# Patient Record
Sex: FEMALE | Race: WHITE | NOT HISPANIC OR LATINO | Employment: FULL TIME | URBAN - METROPOLITAN AREA
[De-identification: names, ages, dates, MRNs, and addresses within clinical notes are randomized per-mention and may not be internally consistent; named-entity substitution may affect disease eponyms.]

---

## 2022-03-01 ENCOUNTER — OFFICE VISIT (OUTPATIENT)
Dept: FAMILY MEDICINE | Facility: CLINIC | Age: 37
End: 2022-03-01
Payer: COMMERCIAL

## 2022-03-01 VITALS
OXYGEN SATURATION: 97 % | DIASTOLIC BLOOD PRESSURE: 86 MMHG | BODY MASS INDEX: 33.99 KG/M2 | HEART RATE: 86 BPM | HEIGHT: 65 IN | SYSTOLIC BLOOD PRESSURE: 120 MMHG | WEIGHT: 204 LBS

## 2022-03-01 DIAGNOSIS — R05.9 COUGH: Primary | ICD-10-CM

## 2022-03-01 DIAGNOSIS — J34.89 SINUS PRESSURE: ICD-10-CM

## 2022-03-01 DIAGNOSIS — R06.02 SHORTNESS OF BREATH: ICD-10-CM

## 2022-03-01 LAB
ANION GAP SERPL CALCULATED.3IONS-SCNC: 12 MMOL/L (ref 5–18)
BASOPHILS # BLD AUTO: 0 10E3/UL (ref 0–0.2)
BASOPHILS NFR BLD AUTO: 0 %
BUN SERPL-MCNC: 8 MG/DL (ref 8–22)
CALCIUM SERPL-MCNC: 9.2 MG/DL (ref 8.5–10.5)
CHLORIDE BLD-SCNC: 106 MMOL/L (ref 98–107)
CO2 SERPL-SCNC: 23 MMOL/L (ref 22–31)
CREAT SERPL-MCNC: 0.77 MG/DL (ref 0.6–1.1)
D DIMER PPP FEU-MCNC: 0.33 UG/ML FEU (ref 0–0.5)
EOSINOPHIL # BLD AUTO: 0.5 10E3/UL (ref 0–0.7)
EOSINOPHIL NFR BLD AUTO: 5 %
ERYTHROCYTE [DISTWIDTH] IN BLOOD BY AUTOMATED COUNT: 12.8 % (ref 10–15)
GFR SERPL CREATININE-BSD FRML MDRD: >90 ML/MIN/1.73M2
GLUCOSE BLD-MCNC: 94 MG/DL (ref 70–125)
HCT VFR BLD AUTO: 39.2 % (ref 35–47)
HGB BLD-MCNC: 13 G/DL (ref 11.7–15.7)
IMM GRANULOCYTES # BLD: 0 10E3/UL
IMM GRANULOCYTES NFR BLD: 0 %
LYMPHOCYTES # BLD AUTO: 3.6 10E3/UL (ref 0.8–5.3)
LYMPHOCYTES NFR BLD AUTO: 36 %
MCH RBC QN AUTO: 28.3 PG (ref 26.5–33)
MCHC RBC AUTO-ENTMCNC: 33.2 G/DL (ref 31.5–36.5)
MCV RBC AUTO: 85 FL (ref 78–100)
MONOCYTES # BLD AUTO: 0.5 10E3/UL (ref 0–1.3)
MONOCYTES NFR BLD AUTO: 5 %
NEUTROPHILS # BLD AUTO: 5.3 10E3/UL (ref 1.6–8.3)
NEUTROPHILS NFR BLD AUTO: 54 %
PLATELET # BLD AUTO: 329 10E3/UL (ref 150–450)
POTASSIUM BLD-SCNC: 3.8 MMOL/L (ref 3.5–5)
RBC # BLD AUTO: 4.6 10E6/UL (ref 3.8–5.2)
SODIUM SERPL-SCNC: 141 MMOL/L (ref 136–145)
WBC # BLD AUTO: 10 10E3/UL (ref 4–11)

## 2022-03-01 PROCEDURE — 36415 COLL VENOUS BLD VENIPUNCTURE: CPT | Performed by: PHYSICIAN ASSISTANT

## 2022-03-01 PROCEDURE — 80048 BASIC METABOLIC PNL TOTAL CA: CPT | Performed by: PHYSICIAN ASSISTANT

## 2022-03-01 PROCEDURE — 85025 COMPLETE CBC W/AUTO DIFF WBC: CPT | Performed by: PHYSICIAN ASSISTANT

## 2022-03-01 PROCEDURE — 99204 OFFICE O/P NEW MOD 45 MIN: CPT | Performed by: PHYSICIAN ASSISTANT

## 2022-03-01 PROCEDURE — 85379 FIBRIN DEGRADATION QUANT: CPT | Performed by: PHYSICIAN ASSISTANT

## 2022-03-01 RX ORDER — PREDNISONE 20 MG/1
40 TABLET ORAL DAILY
Qty: 5 TABLET | Refills: 0 | Status: SHIPPED | OUTPATIENT
Start: 2022-03-01 | End: 2022-09-02

## 2022-03-01 RX ORDER — LEVOFLOXACIN 500 MG/1
500 TABLET, FILM COATED ORAL DAILY
Qty: 7 TABLET | Refills: 0 | Status: SHIPPED | OUTPATIENT
Start: 2022-03-01 | End: 2022-03-01

## 2022-03-01 RX ORDER — DOXYCYCLINE 100 MG/1
100 CAPSULE ORAL 2 TIMES DAILY
Qty: 14 CAPSULE | Refills: 0 | Status: SHIPPED | OUTPATIENT
Start: 2022-03-01 | End: 2022-03-08

## 2022-03-01 ASSESSMENT — ENCOUNTER SYMPTOMS
ACTIVITY CHANGE: 0
SINUS PAIN: 1
FATIGUE: 1
STRIDOR: 0
VOMITING: 0
HEADACHES: 0
SORE THROAT: 0
UNEXPECTED WEIGHT CHANGE: 0
FEVER: 0
EYE PAIN: 0
SHORTNESS OF BREATH: 1
ABDOMINAL PAIN: 0
DIAPHORESIS: 0
DIARRHEA: 0
DIZZINESS: 0
NAUSEA: 0
EYE ITCHING: 0
NECK STIFFNESS: 0
BACK PAIN: 0
RHINORRHEA: 1
PALPITATIONS: 0
FACIAL SWELLING: 0
COUGH: 1
EYE REDNESS: 0
LIGHT-HEADEDNESS: 0
EYE DISCHARGE: 0
CONSTIPATION: 0
CHILLS: 0
WHEEZING: 0

## 2022-03-01 NOTE — PROGRESS NOTES
Progress Note  3/01/22     Chief Complaint   Patient presents with     Sinus Problem     end DEc pt had covid, then after got lots of sinus drainage and was treated, but still not getting better, pt having pain in back with deep breath          HPI    Caro Conrad is a pleasant  36 year old year old female  who present to the clinic today for ongoing sinus pressure, congestion, cough, and dyspnea on exertion.  He was diagnosed with COVID-19 the end of December had 5 days of fevers, chills, body aches and fully recovered.  Her symptoms started a couple weeks after this.  She has been having ongoing symptoms for about 2 months.  She was treated with 2 rounds of cefdinir, and prednisone.  Was also having some dizziness and was treated with meclizine which helped with the dizziness.  She feels like she was improving in regards to the sinus pressure and cough towards the end of the antibiotics but once the antibiotics were completed her symptoms returned.  She states that she is having some mild dyspnea with going up a flight of stairs.  She denies any chest pains.  Blood pressure today is 120/86.  SPO2 is 97% on room air.  She is not tachycardic with a heart rate of 86.    ROS    Review of Systems   Constitutional: Positive for fatigue. Negative for activity change, chills, diaphoresis, fever and unexpected weight change.   HENT: Positive for congestion, ear pain (Right), postnasal drip, rhinorrhea and sinus pain. Negative for dental problem, ear discharge, facial swelling, nosebleeds and sore throat.    Eyes: Negative for pain, discharge, redness and itching.   Respiratory: Positive for cough and shortness of breath. Negative for wheezing and stridor.    Cardiovascular: Negative for chest pain and palpitations.   Gastrointestinal: Negative for abdominal pain, constipation, diarrhea, nausea and vomiting.   Musculoskeletal: Negative for back pain and neck stiffness.   Skin: Negative for rash.   Neurological:  "Negative for dizziness, light-headedness and headaches.            There is no problem list on file for this patient.       No past medical history on file.       Social History     Socioeconomic History     Marital status:      Spouse name: Not on file     Number of children: Not on file     Years of education: Not on file     Highest education level: Not on file   Occupational History     Not on file   Tobacco Use     Smoking status: Current Every Day Smoker     Smokeless tobacco: Never Used   Substance and Sexual Activity     Alcohol use: No     Drug use: Not on file     Sexual activity: Not on file   Other Topics Concern     Not on file   Social History Narrative     Not on file     Social Determinants of Health     Financial Resource Strain: Not on file   Food Insecurity: Not on file   Transportation Needs: Not on file   Physical Activity: Not on file   Stress: Not on file   Social Connections: Not on file   Intimate Partner Violence: Not on file   Housing Stability: Not on file           Allergies   Allergen Reactions     Codeine Other (See Comments)     itching     Hydrocodone-Acetaminophen Itching     Iron Itching     Latex Hives     Oxycodone Itching          Current Outpatient Medications   Medication     doxycycline hyclate (VIBRAMYCIN) 100 MG capsule     predniSONE (DELTASONE) 20 MG tablet     No current facility-administered medications for this visit.            /86   Pulse 86   Ht 1.651 m (5' 5\")   Wt 92.5 kg (204 lb)   LMP 02/26/2022 (Exact Date)   SpO2 97%   Breastfeeding No   BMI 33.95 kg/m       Recent Results (from the past 240 hour(s))   Basic metabolic panel    Collection Time: 03/01/22  4:26 PM   Result Value Ref Range    Sodium 141 136 - 145 mmol/L    Potassium 3.8 3.5 - 5.0 mmol/L    Chloride 106 98 - 107 mmol/L    Carbon Dioxide (CO2) 23 22 - 31 mmol/L    Anion Gap 12 5 - 18 mmol/L    Urea Nitrogen 8 8 - 22 mg/dL    Creatinine 0.77 0.60 - 1.10 mg/dL    Calcium 9.2 8.5 - " 10.5 mg/dL    Glucose 94 70 - 125 mg/dL    GFR Estimate >90 >60 mL/min/1.73m2   D dimer, quantitative    Collection Time: 03/01/22  4:26 PM   Result Value Ref Range    D-Dimer Quantitative 0.33 0.00 - 0.50 ug/mL FEU   CBC with platelets and differential    Collection Time: 03/01/22  4:26 PM   Result Value Ref Range    WBC Count 10.0 4.0 - 11.0 10e3/uL    RBC Count 4.60 3.80 - 5.20 10e6/uL    Hemoglobin 13.0 11.7 - 15.7 g/dL    Hematocrit 39.2 35.0 - 47.0 %    MCV 85 78 - 100 fL    MCH 28.3 26.5 - 33.0 pg    MCHC 33.2 31.5 - 36.5 g/dL    RDW 12.8 10.0 - 15.0 %    Platelet Count 329 150 - 450 10e3/uL    % Neutrophils 54 %    % Lymphocytes 36 %    % Monocytes 5 %    % Eosinophils 5 %    % Basophils 0 %    % Immature Granulocytes 0 %    Absolute Neutrophils 5.3 1.6 - 8.3 10e3/uL    Absolute Lymphocytes 3.6 0.8 - 5.3 10e3/uL    Absolute Monocytes 0.5 0.0 - 1.3 10e3/uL    Absolute Eosinophils 0.5 0.0 - 0.7 10e3/uL    Absolute Basophils 0.0 0.0 - 0.2 10e3/uL    Absolute Immature Granulocytes 0.0 <=0.4 10e3/uL        Physical Exam:     Physical Exam  Vitals and nursing note reviewed.   Constitutional:       General: She is awake. She is not in acute distress.     Appearance: Normal appearance. She is well-developed and well-groomed. She is not ill-appearing, toxic-appearing or diaphoretic.   HENT:      Head: Normocephalic and atraumatic.      Right Ear: No swelling or tenderness. A middle ear effusion is present. No mastoid tenderness. Tympanic membrane is not erythematous, retracted or bulging.      Left Ear: Tympanic membrane, ear canal and external ear normal. No mastoid tenderness.      Mouth/Throat:      Lips: Pink.      Mouth: Mucous membranes are moist.      Tongue: No lesions.      Pharynx: Oropharynx is clear.      Tonsils: No tonsillar exudate or tonsillar abscesses. 0 on the right. 0 on the left.   Eyes:      General: Lids are normal.         Right eye: No discharge.         Left eye: No discharge.       Extraocular Movements: Extraocular movements intact.      Conjunctiva/sclera: Conjunctivae normal.   Neck:      Vascular: No JVD.      Trachea: Trachea normal.   Cardiovascular:      Rate and Rhythm: Normal rate and regular rhythm.      Heart sounds: No murmur heard.    No friction rub. No gallop.   Pulmonary:      Effort: Pulmonary effort is normal. No accessory muscle usage, prolonged expiration or respiratory distress.      Breath sounds: No decreased breath sounds, wheezing, rhonchi or rales.   Musculoskeletal:      Cervical back: Full passive range of motion without pain and neck supple.      Right lower leg: No edema.      Left lower leg: No edema.   Lymphadenopathy:      Cervical: No cervical adenopathy.   Skin:     General: Skin is warm.      Findings: No rash.   Neurological:      General: No focal deficit present.      Mental Status: She is alert and oriented to person, place, and time.      GCS: GCS eye subscore is 4. GCS verbal subscore is 5. GCS motor subscore is 6.      Gait: Gait is intact.   Psychiatric:         Behavior: Behavior is cooperative.          Assessment/Plan:       ICD-10-CM    1. Cough  R05.9 XR Chest 2 Views     CBC with platelets and differential     Basic metabolic panel     D dimer, quantitative     CBC with platelets and differential     Basic metabolic panel     D dimer, quantitative   2. Sinus pressure  J34.89 XR Chest 2 Views     CBC with platelets and differential     Basic metabolic panel     D dimer, quantitative     CBC with platelets and differential     Basic metabolic panel     D dimer, quantitative     predniSONE (DELTASONE) 20 MG tablet     doxycycline hyclate (VIBRAMYCIN) 100 MG capsule     DISCONTINUED: levofloxacin (LEVAQUIN) 500 MG tablet   3. Shortness of breath  R06.02        #1 chronic sinusitis-been on 2 rounds of cefdinir and prednisone.  Symptoms do improve while on antibiotics but slowly return after she finishes.  Start her on Doxy 100 mg x 7 days and  prednisone 40 mg x 5days.If not improving after antibiotics and prednisone will need to do CT sinuses and ENT referral. She is to update me in 1-2 weeks.     #2 cough and shortness of breath-she has been treated with 2 rounds of cefdinir.  I will get a chest x-ray CBC and a BMP today.  She did have Covid the end of December.  She continues to have cough and exertional shortness of breath.  No chest pains.  Blood pressure is 120/86 sats are 97% on room air 86 for pulse.  She does not appear to be in any respiratory distress.  Due to the mild exertional dyspnea we will do a D-dimer to rule out PE with her recent Covid.  If this is positive we will move forward with a CT PE protocol.D-dimer negative. CXR negative for infection. WBC WNL. Kidney function stable.      Earnest Marrero PA-C

## 2022-03-25 ENCOUNTER — MYC MEDICAL ADVICE (OUTPATIENT)
Dept: FAMILY MEDICINE | Facility: CLINIC | Age: 37
End: 2022-03-25
Payer: COMMERCIAL

## 2022-03-25 DIAGNOSIS — J32.9 CHRONIC SINUSITIS, UNSPECIFIED LOCATION: Primary | ICD-10-CM

## 2022-03-25 DIAGNOSIS — J34.89 SINUS PRESSURE: Primary | ICD-10-CM

## 2022-03-25 RX ORDER — FLUCONAZOLE 150 MG/1
150 TABLET ORAL ONCE
Qty: 1 TABLET | Refills: 0 | Status: SHIPPED | OUTPATIENT
Start: 2022-03-25 | End: 2022-03-25

## 2022-03-25 RX ORDER — DOXYCYCLINE 100 MG/1
100 CAPSULE ORAL 2 TIMES DAILY
Qty: 20 CAPSULE | Refills: 0 | Status: SHIPPED | OUTPATIENT
Start: 2022-03-25 | End: 2022-04-04

## 2022-03-25 RX ORDER — PREDNISONE 20 MG/1
40 TABLET ORAL DAILY
Qty: 10 TABLET | Refills: 0 | Status: SHIPPED | OUTPATIENT
Start: 2022-03-25 | End: 2022-03-30

## 2022-04-02 ENCOUNTER — HEALTH MAINTENANCE LETTER (OUTPATIENT)
Age: 37
End: 2022-04-02

## 2022-04-14 ENCOUNTER — OFFICE VISIT (OUTPATIENT)
Dept: OTOLARYNGOLOGY | Facility: CLINIC | Age: 37
End: 2022-04-14
Attending: PHYSICIAN ASSISTANT
Payer: COMMERCIAL

## 2022-04-14 DIAGNOSIS — J34.89 SINUS PRESSURE: ICD-10-CM

## 2022-04-14 PROCEDURE — 99203 OFFICE O/P NEW LOW 30 MIN: CPT | Performed by: OTOLARYNGOLOGY

## 2022-04-14 RX ORDER — IPRATROPIUM BROMIDE 21 UG/1
2 SPRAY, METERED NASAL EVERY 12 HOURS
Qty: 30 ML | Refills: 11 | Status: SHIPPED | OUTPATIENT
Start: 2022-04-14 | End: 2022-05-14

## 2022-04-14 NOTE — LETTER
4/14/2022         RE: Caro Conrad  [address] [place] [address]        Dear Colleague,    Thank you for referring your patient, Caro Conrad, to the [facility]. Please see a copy of my visit note below.    HPI: This patient is a 37yo F who presents for evaluation of the sinuses at the request of Earnest Marrero PA-C. The patient reports constant runny nose and congestion since probable covid around the holidays. Has almost daily headaches now too. Has been on 4 rounds of antibiotics without improvement. States that she used a nasal spray rx'ed to her for a month.     Past medical history, surgical history, social history, family history, medications, and allergies have been reviewed with the patient and are documented above.    Review of Systems: a 10-system review was performed. Pertinent positives are noted in the HPI and on a separate scanned document in the chart.    PHYSICAL EXAMINATION:  GEN: no acute distress, normocephalic  EYES: extraocular movements are intact, pupils are equal and round. Sclera clear.   EARS: auricles are normally formed. The external auditory canals are clear with minimal to no cerumen. Tympanic membranes are intact bilaterally with no signs of infection, effusion, retractions, or perforations.  NOSE: anterior nares are patent. There are no masses or lesions. The septum is non-obstructing. No percussive tenderness over the maxillary or frontal sinuses. Boggy turbinates. No polyps seen.  OC/OP: clear, dentition is in good repair but with wear that is consistent with clenching/grinding. The tongue and palate are fully mobile and symmetric. No masses or lesions. 3+ tonsils, symmetric.  NECK: soft and supple. No lymphadenopathy or masses. Airway is midline. +R>L TMJ pain on palpation.  NEURO: CN VII and XII symmetric. alert and oriented. No spontaneous nystagmus. Gait is normal.  PULM: breathing comfortably on room air,  normal chest expansion with respiration  CARDS: no cyanosis or clubbing. Normal carotid pulses.    IMAGING: none    MEDICAL DECISION-MAKING: This patient is a 35yo F with a lot of symptoms. Will simply start with a CT of the sinuses and follow that where it leads. If there are polyps or chronic sinus disease identified, will have a plan for that. If the sinuses are clear on imaging, symptoms may be secondary to allergies, TMJ, combination, etc. Will determine next best steps after imaging is available.           Again, thank you for allowing me to participate in the care of your patient.        Sincerely,        Candida Peña MD

## 2022-04-14 NOTE — PROGRESS NOTES
HPI: This patient is a 37yo F who presents for evaluation of the sinuses at the request of Earnest Marrero PA-C. The patient reports constant runny nose and congestion since probable covid around the holidays. Has almost daily headaches now too. Has been on 4 rounds of antibiotics without improvement. States that she used a nasal spray rx'ed to her for a month.     Past medical history, surgical history, social history, family history, medications, and allergies have been reviewed with the patient and are documented above.    Review of Systems: a 10-system review was performed. Pertinent positives are noted in the HPI and on a separate scanned document in the chart.    PHYSICAL EXAMINATION:  GEN: no acute distress, normocephalic  EYES: extraocular movements are intact, pupils are equal and round. Sclera clear.   EARS: auricles are normally formed. The external auditory canals are clear with minimal to no cerumen. Tympanic membranes are intact bilaterally with no signs of infection, effusion, retractions, or perforations.  NOSE: anterior nares are patent. There are no masses or lesions. The septum is non-obstructing. No percussive tenderness over the maxillary or frontal sinuses. Boggy turbinates. No polyps seen.  OC/OP: clear, dentition is in good repair but with wear that is consistent with clenching/grinding. The tongue and palate are fully mobile and symmetric. No masses or lesions. 3+ tonsils, symmetric.  NECK: soft and supple. No lymphadenopathy or masses. Airway is midline. +R>L TMJ pain on palpation.  NEURO: CN VII and XII symmetric. alert and oriented. No spontaneous nystagmus. Gait is normal.  PULM: breathing comfortably on room air, normal chest expansion with respiration  CARDS: no cyanosis or clubbing. Normal carotid pulses.    IMAGING: none    MEDICAL DECISION-MAKING: This patient is a 37yo F with a lot of symptoms. Will simply start with a CT of the sinuses and follow that where it leads. If there are  polyps or chronic sinus disease identified, will have a plan for that. If the sinuses are clear on imaging, symptoms may be secondary to allergies, TMJ, combination, etc. Will determine next best steps after imaging is available.

## 2022-04-24 ENCOUNTER — HOSPITAL ENCOUNTER (OUTPATIENT)
Dept: CT IMAGING | Facility: CLINIC | Age: 37
Discharge: HOME | End: 2022-04-24
Attending: OTOLARYNGOLOGY | Admitting: OTOLARYNGOLOGY
Payer: COMMERCIAL

## 2022-04-24 DIAGNOSIS — J34.89 SINUS PRESSURE: ICD-10-CM

## 2022-04-24 PROCEDURE — 70486 CT MAXILLOFACIAL W/O DYE: CPT

## 2022-04-29 ENCOUNTER — TELEPHONE (OUTPATIENT)
Dept: OTOLARYNGOLOGY | Facility: CLINIC | Age: 37
End: 2022-04-29
Payer: COMMERCIAL

## 2022-04-29 NOTE — TELEPHONE ENCOUNTER
Left a message for Caro. Her CT sinus is actually clear. There is comment about partial effacement of the [facility] on the left, where there is a little thickness to the mucosa, but this is no obstructing and cannot be the reason for her headaches or bilaterally runny nose. Can refer to Allergy for the rhinorrhea, and/or Neurology or TMJ clinic for the headaches.

## 2022-05-02 DIAGNOSIS — R51.9 ACUTE NONINTRACTABLE HEADACHE, UNSPECIFIED HEADACHE TYPE: Primary | ICD-10-CM

## 2022-05-02 NOTE — PROGRESS NOTES
Spoke with Caro regarding her CT scan. The scan does not reveal any sinus finding for her headaches and it is sounding more and more like migraines. A person in her [place] suggested CSF leak, which is highly unlikely; however, it is easy to test for. Will 1) refer to Neurology for headaches and 2) have my nurse coordinate with Caro regarding a collection vessel for some of the rhinorrhea to send off for beta-2 transferrin.

## 2022-08-10 ENCOUNTER — MYC MEDICAL ADVICE (OUTPATIENT)
Dept: FAMILY MEDICINE | Facility: CLINIC | Age: 37
End: 2022-08-10

## 2022-08-26 ENCOUNTER — MYC MEDICAL ADVICE (OUTPATIENT)
Dept: FAMILY MEDICINE | Facility: CLINIC | Age: 37
End: 2022-08-26

## 2022-08-26 ENCOUNTER — OFFICE VISIT (OUTPATIENT)
Dept: FAMILY MEDICINE | Facility: CLINIC | Age: 37
End: 2022-08-26
Payer: COMMERCIAL

## 2022-08-26 VITALS
HEART RATE: 68 BPM | BODY MASS INDEX: 33.28 KG/M2 | OXYGEN SATURATION: 97 % | DIASTOLIC BLOOD PRESSURE: 84 MMHG | WEIGHT: 200 LBS | TEMPERATURE: 97.9 F | SYSTOLIC BLOOD PRESSURE: 122 MMHG | RESPIRATION RATE: 16 BRPM

## 2022-08-26 DIAGNOSIS — J02.9 VIRAL PHARYNGITIS: Primary | ICD-10-CM

## 2022-08-26 LAB
DEPRECATED S PYO AG THROAT QL EIA: NEGATIVE
GROUP A STREP BY PCR: NOT DETECTED
SARS-COV-2 RNA RESP QL NAA+PROBE: NEGATIVE

## 2022-08-26 PROCEDURE — U0003 INFECTIOUS AGENT DETECTION BY NUCLEIC ACID (DNA OR RNA); SEVERE ACUTE RESPIRATORY SYNDROME CORONAVIRUS 2 (SARS-COV-2) (CORONAVIRUS DISEASE [COVID-19]), AMPLIFIED PROBE TECHNIQUE, MAKING USE OF HIGH THROUGHPUT TECHNOLOGIES AS DESCRIBED BY CMS-2020-01-R: HCPCS | Performed by: PHYSICIAN ASSISTANT

## 2022-08-26 PROCEDURE — U0005 INFEC AGEN DETEC AMPLI PROBE: HCPCS | Performed by: PHYSICIAN ASSISTANT

## 2022-08-26 PROCEDURE — 99213 OFFICE O/P EST LOW 20 MIN: CPT | Mod: CS | Performed by: PHYSICIAN ASSISTANT

## 2022-08-26 PROCEDURE — 87651 STREP A DNA AMP PROBE: CPT | Performed by: PHYSICIAN ASSISTANT

## 2022-08-26 RX ORDER — IBUPROFEN 600 MG/1
600 TABLET, FILM COATED ORAL EVERY 6 HOURS PRN
Qty: 30 TABLET | Refills: 0 | Status: SHIPPED | OUTPATIENT
Start: 2022-08-26 | End: 2022-09-02

## 2022-08-26 RX ORDER — IBUPROFEN 200 MG
200 TABLET ORAL EVERY 4 HOURS PRN
COMMUNITY
End: 2022-09-02

## 2022-08-26 NOTE — PROGRESS NOTES
Assessment & Plan:      Problem List Items Addressed This Visit    None     Visit Diagnoses     Viral pharyngitis    -  Primary    Relevant Medications    ibuprofen (ADVIL/MOTRIN) 600 MG tablet    Other Relevant Orders    Streptococcus A Rapid Screen w/Reflex to PCR - Clinic Collect (Completed)    Symptomatic; Yes; 8/23/2022 COVID-19 Virus (Coronavirus) by PCR Nose    Group A Streptococcus PCR Throat Swab        Medical Decision Making  Patient presents with acute onset sore throat and ear pains.  No signs of otitis media and rapid strep is negative at this time.  Suspect likely viral pharyngitis.  Recommend over-the-counter analgesics, honey, salt water gargles, fluids, and rest.  There is in process COVID-19.  Discussed signs of worsening symptoms and when to follow-up with PCP if no symptom improvement.     Subjective:      Caro Conrad is a 37 year old female here for evaluation of throat pain.  Onset of symptoms was 2 to 3 days ago.  Associated symptoms include right ear discomfort and swollen lymph nodes in the neck.  Patient did note subjective fevers and chills.  She started taking a leftover prescription of amoxicillin from her dentist.  She was taking 2 pills a day for 3 days.  She ran out of the medications and states her symptoms only improved slightly.  She still has significant throat discomfort.     The following portions of the patient's history were reviewed and updated as appropriate: allergies, current medications, and problem list.     Review of Systems  Pertinent items are noted in HPI.    Allergies  Allergies   Allergen Reactions     Codeine Other (See Comments)     itching     Hydrocodone-Acetaminophen Itching     Iron Itching     Latex Hives     Oxycodone Itching       No family history on file.    Social History     Tobacco Use     Smoking status: Current Every Day Smoker     Smokeless tobacco: Never Used   Substance Use Topics     Alcohol use: No        Objective:      /84    Pulse 68   Temp 97.9  F (36.6  C)   Resp 16   Wt 90.7 kg (200 lb)   LMP 08/10/2022   SpO2 97%   Breastfeeding No   BMI 33.28 kg/m    General appearance - alert, well appearing, and in no distress and non-toxic  Ears - bilateral TM's and external ear canals normal  Nose - normal and patent, no erythema, discharge or polyps  Mouth - Significant tonsillar swelling with erythema, no exudate, mucous membranes moist, lips and tongue normal  Neck - Moderate bilateral anterior cervical lymphadenopathy  Chest - clear to auscultation, no wheezes, rales or rhonchi, symmetric air entry  Heart - normal rate, regular rhythm, normal S1, S2, no murmurs, rubs, clicks or gallops     Lab & Imaging Results    Results for orders placed or performed in visit on 08/26/22   Streptococcus A Rapid Screen w/Reflex to PCR - Clinic Collect     Status: Normal    Specimen: Throat; Swab   Result Value Ref Range    Group A Strep antigen Negative Negative       I personally reviewed these results and discussed findings with the patient.    The use of Dragon/[employer] dictation services was used to construct the content of this note; any grammatical errors are non-intentional. Please contact the author directly if you are in need of any clarification.

## 2022-09-02 ENCOUNTER — OFFICE VISIT (OUTPATIENT)
Dept: FAMILY MEDICINE | Facility: CLINIC | Age: 37
End: 2022-09-02
Payer: COMMERCIAL

## 2022-09-02 VITALS
RESPIRATION RATE: 16 BRPM | BODY MASS INDEX: 33.58 KG/M2 | DIASTOLIC BLOOD PRESSURE: 84 MMHG | HEART RATE: 83 BPM | SYSTOLIC BLOOD PRESSURE: 116 MMHG | WEIGHT: 201.8 LBS | OXYGEN SATURATION: 97 %

## 2022-09-02 DIAGNOSIS — J30.1 SEASONAL ALLERGIC RHINITIS DUE TO POLLEN: ICD-10-CM

## 2022-09-02 DIAGNOSIS — F90.9 ATTENTION DEFICIT HYPERACTIVITY DISORDER (ADHD), UNSPECIFIED ADHD TYPE: Primary | ICD-10-CM

## 2022-09-02 PROCEDURE — 99214 OFFICE O/P EST MOD 30 MIN: CPT | Performed by: PHYSICIAN ASSISTANT

## 2022-09-02 RX ORDER — DEXTROAMPHETAMINE SACCHARATE, AMPHETAMINE ASPARTATE MONOHYDRATE, DEXTROAMPHETAMINE SULFATE AND AMPHETAMINE SULFATE 2.5; 2.5; 2.5; 2.5 MG/1; MG/1; MG/1; MG/1
10 CAPSULE, EXTENDED RELEASE ORAL DAILY
Qty: 30 CAPSULE | Refills: 0 | Status: SHIPPED | OUTPATIENT
Start: 2022-09-02 | End: 2022-10-14

## 2022-09-02 RX ORDER — MONTELUKAST SODIUM 10 MG/1
10 TABLET ORAL AT BEDTIME
Qty: 30 TABLET | Refills: 11 | Status: SHIPPED | OUTPATIENT
Start: 2022-09-02 | End: 2022-09-12

## 2022-09-02 RX ORDER — LORATADINE 10 MG/1
10 TABLET ORAL DAILY
COMMUNITY

## 2022-09-02 ASSESSMENT — ENCOUNTER SYMPTOMS
NERVOUS/ANXIOUS: 0
HEARTBURN: 0
SLEEP DISTURBANCE: 0
CONFUSION: 0
CONSTIPATION: 0
VOMITING: 0
WHEEZING: 0
DECREASED CONCENTRATION: 1
LIGHT-HEADEDNESS: 0
DIZZINESS: 0
CHILLS: 0
HEADACHES: 0
PALPITATIONS: 0
COUGH: 1
SHORTNESS OF BREATH: 0
DIARRHEA: 0
NAUSEA: 0
ABDOMINAL PAIN: 0
HYPERACTIVE: 0
FATIGUE: 0
HALLUCINATIONS: 0
SINUS PRESSURE: 0
SINUS PAIN: 0
AGITATION: 0
RHINORRHEA: 0

## 2022-09-02 ASSESSMENT — PAIN SCALES - GENERAL: PAINLEVEL: NO PAIN (0)

## 2022-09-02 NOTE — LETTER
[facility]  09/02/22  Patient: Caro Conrad  YOB: 1985  Medical Record Number: [id number]                                                                                  Non-Opioid Controlled Substance Agreement    This is an agreement between you and your provider regarding safe and appropriate use of controlled substances prescribed by your care team. Controlled substances are?medicines that can cause physical and mental dependence (abuse).     There are strict laws about having and using these medicines. We here at [facility] are  committed to working with you in your efforts to get better. To support you in this work, we'll help you schedule regular office appointments for medicine refills. If we must cancel or change your appointment for any reason, we'll make sure you have enough medicine to last until your next appointment.     As a Provider, I will:     Listen carefully to your concerns while treating you with respect.     Recommend a treatment plan that I believe is in your best interest and may involve therapies other than medicine.      Talk with you often about the possible benefits and the risk of harm of any medicine that we prescribe for you.    Assess the safety of this medicine and check how well it works.      Provide a plan on how to taper (discontinue or go off) using this medicine if the decision is made to stop its use.      ::  As a Patient, I understand controlled substances:       Are prescribed by my care provider to help me function or work and manage my condition(s).?    Are strong medicines and can cause serious side effects.       Need to be taken exactly as prescribed.?Combining controlled substances with certain medicines or chemicals (such as illegal drugs, alcohol, sedatives, sleeping pills, and benzodiazepines) can be dangerous or even fatal.? If I stop taking my medicines suddenly, I may have severe withdrawal symptoms.      The risks, benefits, and side effects of these medicine(s) were explained to me. I agree that:    1. I will take part in other treatments as advised by my care team. This may be psychiatry or counseling, physical therapy, behavioral therapy, group treatment or a referral to specialist.    2. I will keep all my appointments and understand this is part of the monitoring of controlled substances.?My care team may require an office visit for EVERY controlled substance refill. If I miss appointments or don t follow instructions, my care team may stop my medicine    3. I will take my medicines as prescribed. I will not change the dose or schedule unless my care team tells me to. There will be no refills if I run out early.      4. I may be asked to come to the clinic and complete a urine drug test or complete a pill count. If I don t give a urine sample or participate in a pill count, the care team may stop my medicine.    5. I will only receive controlled substance prescriptions from this clinic. If I am treated by another provider, I will tell them that I am taking controlled substances and that I have a treatment agreement with this provider. I will inform my [facility] care team within one business day if I am given a prescription for any controlled substance by another healthcare provider. My [facility] care team can contact other providers and pharmacists about my use of any medicines.    6. It is up to me to make sure that I don't run out of my medicines on weekends or holidays.?If my care team is willing to refill my prescription without a visit, I must request refills only during office hours. Refills may take up to 3 business days to process. I will use one pharmacy to fill all my controlled substance prescriptions. I will notify the clinic about any changes to my insurance or medicine availability.    7. I am responsible for my prescriptions. If the medicine/prescription is lost, stolen or  destroyed, it will not be replaced.?I also agree not to share controlled substance medicines with anyone.     8. I am aware I should not use any illegal or recreational drugs. I agree not to drink alcohol unless my care team says I can.     9. If I enroll in the [place] Medical Cannabis program, I will tell my care team before my next refill.    10. I will tell my care team right away if I become pregnant, have a new medical problem treated outside of my regular clinic, or have a change in my medicines.     11. I understand that this medicine can affect my thinking, judgment and reaction time.? Alcohol and drugs affect the brain and body, which can affect the safety of my driving. Being under the influence of alcohol or drugs can affect my decision-making, behaviors, personal safety and the safety of others. Driving while impaired (DWI) can occur if a person is driving, operating or in physical control of a car, motorcycle, boat, snowmobile, ATV, motorbike, off-road vehicle or any other motor vehicle ([place] Statute 169[place].20). I understand the risk if I choose to drive or operate any vehicle or machinery.    I understand that if I do not follow any of the conditions above, my prescriptions or treatment may be stopped or changed.   I agree that my provider, clinic care team and pharmacy may work with any city, state or federal law enforcement agency that investigates the misuse, sale or other diversion of my controlled medicine. I will allow my provider to discuss my care with, or share a copy of, this agreement with any other treating provider, pharmacy or emergency room where I receive care.     I have read this agreement and have asked questions about anything I did not understand.    ________________________________________________________  Patient Signature - Caro Conrad     ___________________                   Date     ________________________________________________________  Provider Signature - Earnest  RAHEL Marrero, PA-C       ___________________                   Date     ________________________________________________________  Witness Signature (required if provider not present while patient signing)          ___________________                   Date

## 2022-09-02 NOTE — PROGRESS NOTES
Assessment & Plan       ICD-10-CM    1. Attention deficit hyperactivity disorder (ADHD), unspecified ADHD type  F90.9 amphetamine-dextroamphetamine (ADDERALL XR) 10 MG 24 hr capsule   2. Seasonal allergic rhinitis due to pollen  J30.1 montelukast (SINGULAIR) 10 MG tablet     #1 ADHD-we will restart her on Adderall 10 mg daily.  She tolerated the Adderall in the past.  Side effects of the medication were discussed.  Controlled substance agreement updated.  I would like to see her back in 1month for recheck on how things are going after starting Adderall.  She will need visits every 3 months thereafter.    #2 seasonal allergies-continue with over-the-counter antihistamines and I will start her on Singulair.  Could also start Flonase.     No follow-ups on file.    KASI Malone [facility]   Caro is a 37 year old, presenting for the following health issues:  Medication Update (Would like to go back on Adderall. Pt states it worked well for her but she stopped it because she felt slow and she was working from home so she thought she could manage it on her own. Now she is having compulsive thoughts and having trouble staying focused with work. Pt is still working from home currently. ) and Nasal Congestion (Runny nose and sneezing started when she had covid 9 months ago. Has been taking claritin for allergies but it doesn't help at all. Looking for other recommendations. )      Caro is a pleasant 37-year-old female that presents to the clinic today for restarting ADHD medications.  She states that she was diagnosed with ADHD about 10 years ago.  She was on Adderall.  She stopped the Adderall December 2021 as she was working from home and thought she could go without the stimulants.  The last 2 or 3 months she is having difficulty focusing, concentration, and finishing simple tasks.  It is also interfering with her work.  He would like to restart on Adderall as  her symptoms have worsened.    She also has underlying seasonal allergies and using over-the-counter antihistamines which have not been in helpful.  She did see ENT.  She continues to have sneezing coughing and nasal congestion.    History of Present Illness       Reason for visit:  Medication    She eats 0-1 servings of fruits and vegetables daily.She consumes 0 sweetened beverage(s) daily.She exercises with enough effort to increase her heart rate 9 or less minutes per day.  She exercises with enough effort to increase her heart rate 3 or less days per week.   She is taking medications regularly.             Review of Systems   Constitutional: Negative for chills and fatigue.   HENT: Positive for congestion, postnasal drip and sneezing. Negative for ear discharge, ear pain, rhinorrhea, sinus pressure, sinus pain and tinnitus.    Respiratory: Positive for cough. Negative for shortness of breath and wheezing.    Cardiovascular: Negative for chest pain and palpitations.   Gastrointestinal: Negative for abdominal pain, constipation, diarrhea, heartburn, nausea and vomiting.   Neurological: Negative for dizziness, light-headedness and headaches.   Psychiatric/Behavioral: Positive for decreased concentration. Negative for agitation, behavioral problems, confusion, hallucinations, mood changes, self-injury, sleep disturbance and suicidal ideas. The patient is not nervous/anxious and is not hyperactive.             Objective    /84 (BP Location: Left arm, Patient Position: Sitting, Cuff Size: Adult Regular)   Pulse 83   Resp 16   Wt 91.5 kg (201 lb 12.8 oz)   LMP 08/10/2022   SpO2 97%   Breastfeeding No   BMI 33.58 kg/m    Body mass index is 33.58 kg/m .  Physical Exam  Vitals and nursing note reviewed.   Constitutional:       General: She is awake. She is not in acute distress.     Appearance: Normal appearance. She is well-developed and well-groomed. She is not ill-appearing, toxic-appearing or diaphoretic.    HENT:      Head: Normocephalic and atraumatic.   Cardiovascular:      Rate and Rhythm: Normal rate and regular rhythm.      Heart sounds: S1 normal and S2 normal. No murmur heard.    No friction rub. No gallop.   Pulmonary:      Effort: Pulmonary effort is normal. No accessory muscle usage, prolonged expiration or respiratory distress.      Breath sounds: Normal breath sounds and air entry.   Skin:     General: Skin is warm and dry.      Findings: No rash.   Neurological:      General: No focal deficit present.      Mental Status: She is alert and oriented to person, place, and time.      GCS: GCS eye subscore is 4. GCS verbal subscore is 5. GCS motor subscore is 6.      Motor: No weakness.      Coordination: Coordination is intact.   Psychiatric:         Attention and Perception: Attention normal.         Mood and Affect: Mood normal.         Speech: Speech normal.         Behavior: Behavior is cooperative.         Thought Content: Thought content normal.         Cognition and Memory: Cognition normal.

## 2022-09-12 ENCOUNTER — OFFICE VISIT (OUTPATIENT)
Dept: NEUROLOGY | Facility: CLINIC | Age: 37
End: 2022-09-12
Attending: OTOLARYNGOLOGY
Payer: COMMERCIAL

## 2022-09-12 VITALS
BODY MASS INDEX: 34.49 KG/M2 | HEART RATE: 79 BPM | DIASTOLIC BLOOD PRESSURE: 89 MMHG | SYSTOLIC BLOOD PRESSURE: 129 MMHG | HEIGHT: 65 IN | WEIGHT: 207 LBS

## 2022-09-12 DIAGNOSIS — G44.89 CHRONIC MIXED HEADACHE SYNDROME: ICD-10-CM

## 2022-09-12 PROBLEM — F98.8 ADD (ATTENTION DEFICIT DISORDER): Status: ACTIVE | Noted: 2017-07-31

## 2022-09-12 PROBLEM — R03.0 ELEVATED BLOOD PRESSURE READING WITHOUT DIAGNOSIS OF HYPERTENSION: Status: ACTIVE | Noted: 2021-09-27

## 2022-09-12 PROBLEM — K76.0 FATTY INFILTRATION OF LIVER: Status: ACTIVE | Noted: 2021-09-27

## 2022-09-12 PROCEDURE — 99205 OFFICE O/P NEW HI 60 MIN: CPT | Performed by: PSYCHIATRY & NEUROLOGY

## 2022-09-12 RX ORDER — NORTRIPTYLINE HYDROCHLORIDE 50 MG/1
50 CAPSULE ORAL AT BEDTIME
Qty: 30 CAPSULE | Refills: 11 | Status: SHIPPED | OUTPATIENT
Start: 2022-10-12 | End: 2022-10-14

## 2022-09-12 RX ORDER — NORTRIPTYLINE HCL 25 MG
CAPSULE ORAL
Qty: 60 CAPSULE | Refills: 6 | Status: SHIPPED | OUTPATIENT
Start: 2022-09-12 | End: 2022-10-14

## 2022-09-12 NOTE — LETTER
2022         RE: Caro Conrad  [address] [place] [address]        Dear Colleague,    Thank you for referring your patient, Caro Conrad, to the [facility] [place]. Please see a copy of my visit note below.    NEUROLOGY CONSULTATION NOTE       [facility] [place]  [address] [place] [address]  Tel: [phone]  Fax: [phone]  [email or web address]     Caro Conrad,  1985, MRN [id number]  PCP: Earnest Marrero  Date: 2022     ASSESSMENT & PLAN     Visit Diagnosis  1. Acute nonintractable headache, unspecified headache type     Chronic mixed headache syndrome  37-year-old female with ADD was referred for evaluation of progressively worsening headaches.  She has chronic neck ceric syndrome with combination of migraine headache with aura and chronic tension type headache that I suspect are evolving into rebound headaches as she admits she is taking increased amount of ibuprofen on a weekly basis.  I have recommended:    1.  Start nortriptyline 25 mg daily after 1 week increase it to 50 mg daily  2.  Minimize use of ibuprofen  3.  Follow-up in 3 months with a Melvina and if her headaches are not improving consider switching to Depakote or Topamax    Thank you again for this referral, please feel free to contact me if you have any questions.    Peter Manjarrez MD  [facility]  (Formerly, [facility]     REASON FOR CONSULTATION Headache        HISTORY OF PRESENT ILLNESS     We have been requested by Earnest Marrero to evaluate Caro Conrad who is a 37 year old  female for headache    Patient is a pleasant 37-year-old female with ADD, elevated blood pressure who was referred for evaluation of progressively worsening headaches.  Patient originally is from [place] and remembers even in elementary school she was having headaches.  Previously  she was having headaches that were preceded by some visual symptoms.  After she had her first child her headaches progressively started getting worse.  She gets 2 types of headache her migraine headache occur once or twice a month but she has more frequent headache that is on the top or back of the head.  These headaches are triggered by exposure to bright light.  Also stress tends to make her symptoms worse.  She has been taking increased amount of ibuprofen.  She denies any focal weakness numbness or any speech difficulty.  She cannot identify any triggers for her headaches.  She does admit due to these headaches she is taking more ibuprofen that she should.  She never had any imaging study done     PROBLEM LIST   Patient Active Problem List   Diagnosis Code     ADD (attention deficit disorder) F98.8     Elevated blood pressure reading without diagnosis of hypertension R03.0     Fatty infiltration of liver K76.0     Chronic mixed headache syndrome G44.89         PAST MEDICAL & SURGICAL HISTORY     Past Medical History:   Patient  has no past medical history on file.    Surgical History:  She  has no past surgical history on file.     SOCIAL HISTORY     Reviewed, and she  reports that she has been smoking. She has been smoking about 0.50 packs per day. She has never used smokeless tobacco. She reports that she does not drink alcohol.     FAMILY HISTORY     Reviewed, and family history includes Cerebrovascular Disease in her paternal grandmother; Diabetes in her mother; Heart Disease in her paternal grandmother; Hyperlipidemia in her father; Hypertension in her father; Migraines in her maternal grandmother; Osteoporosis in her father; Thyroid Disease in her father.     ALLERGIES     Allergies   Allergen Reactions     Hydrocodone-Acetaminophen Itching     Iron Itching     Latex Hives     Oxycodone Itching         REVIEW OF SYSTEMS     A 12 point review of system was performed and was negative except as outlined in  "the history of present illness.     HOME MEDICATIONS     Current Outpatient Rx   Medication Sig Dispense Refill     amphetamine-dextroamphetamine (ADDERALL XR) 10 MG 24 hr capsule Take 1 capsule (10 mg) by mouth daily 30 capsule 0     loratadine (CLARITIN) 10 MG tablet Take 10 mg by mouth daily       nortriptyline (PAMELOR) 25 MG capsule 1 PO at bedtime x 1 week then 2 PO QHS 60 capsule 6     [START ON 10/12/2022] nortriptyline (PAMELOR) 50 MG capsule Take 1 capsule (50 mg) by mouth At Bedtime 30 capsule 11         PHYSICAL EXAM     Vital signs  /89 (BP Location: Right arm, Patient Position: Sitting)   Pulse 79   Ht 1.651 m (5' 5\")   Wt 93.9 kg (207 lb)   LMP 08/10/2022   BMI 34.45 kg/m      Weight:   207 lbs 0 oz    Patient is alert and oriented x4 in no acute distress. Vital signs were reviewed and are documented in electronic medical record. Neck was supple, no carotid bruits, thyromegaly, JVD, or lymphadenopathy was noted.   NEUROLOGY EXAM:    Patient s speech was normal with no aphasia or dysarthria. Mentation, and affect were also normal.     Funduscopic exam was normal, with normal cup to disc ratio. Cranial nerves II -XII were intact.     Patient had normal mass, tone and motor strength was 5/5 in all extremities without pronator drift.     Sensation was intact to light touch, pinprick, and vibratory sensation.     Reflexes were 1+ symmetrical with downgoing toes.     No dysmetria noted on FNF or HKS. Romberg was negative.    Gait testing was normal. Able to walk on toes/heels. Tandem walk normal.     PERTINENT DIAGNOSTIC STUDIES     Following studies were reviewed:     CT SINUSES 4/24/2022  Partial effacement of the left ostiomeatal unit secondary to inflammatory mucosal thickening. Patent right ostiomeatal unit. The frontoethmoidal and sphenoethmoidal recesses are patent bilaterally.     Mild inflammatory mucosal thickening of the paranasal sinuses as described above.     PERTINENT " LABS  Following labs were reviewed:  Office Visit on 08/26/2022   Component Date Value     Group A Strep antigen 08/26/2022 Negative      SARS CoV2 PCR 08/26/2022 Negative      Group A strep by PCR 08/26/2022 Not Detected         Total time spent for face to face visit, reviewing labs/imaging studies, counseling and coordination of care was: 1 Hour spent on the date of the encounter doing chart review, review of outside records, review of test results, interpretation of tests, patient visit and documentation       This note was dictated using voice recognition software.  Any grammatical or context distortions are unintentional and inherent to the software.    No orders of the defined types were placed in this encounter.     New Prescriptions    NORTRIPTYLINE (PAMELOR) 25 MG CAPSULE    1 PO at bedtime x 1 week then 2 PO QHS    NORTRIPTYLINE (PAMELOR) 50 MG CAPSULE    Take 1 capsule (50 mg) by mouth At Bedtime      Modified Medications    No medications on file              Again, thank you for allowing me to participate in the care of your patient.        Sincerely,        Peter Manjarrez MD

## 2022-09-12 NOTE — PROGRESS NOTES
NEUROLOGY CONSULTATION NOTE       [facility] [place]  [address] [place] [address]  Tel: [phone]  Fax: [phone]  [email or web address]     Caro Conrad,  1985, MRN [id number]  PCP: Earnest Marrero  Date: 2022     ASSESSMENT & PLAN     Visit Diagnosis  1. Acute nonintractable headache, unspecified headache type     Chronic mixed headache syndrome  37-year-old female with ADD was referred for evaluation of progressively worsening headaches.  She has chronic neck ceric syndrome with combination of migraine headache with aura and chronic tension type headache that I suspect are evolving into rebound headaches as she admits she is taking increased amount of ibuprofen on a weekly basis.  I have recommended:    1.  Start nortriptyline 25 mg daily after 1 week increase it to 50 mg daily  2.  Minimize use of ibuprofen  3.  Follow-up in 3 months with a Melvina and if her headaches are not improving consider switching to Depakote or Topamax    Thank you again for this referral, please feel free to contact me if you have any questions.    Peter Manjarrez MD  [facility]  (Former[facility], [facility]     REASON FOR CONSULTATION Headache        HISTORY OF PRESENT ILLNESS     We have been requested by Earnest Marrero to evaluate Caro Conrad who is a 37 year old  female for headache    Patient is a pleasant 37-year-old female with ADD, elevated blood pressure who was referred for evaluation of progressively worsening headaches.  Patient originally is from [place] and remembers even in elementary school she was having headaches.  Previously she was having headaches that were preceded by some visual symptoms.  After she had her first child her headaches progressively started getting worse.  She gets 2 types of headache her migraine headache occur once or twice a month but she has more frequent headache that is  on the top or back of the head.  These headaches are triggered by exposure to bright light.  Also stress tends to make her symptoms worse.  She has been taking increased amount of ibuprofen.  She denies any focal weakness numbness or any speech difficulty.  She cannot identify any triggers for her headaches.  She does admit due to these headaches she is taking more ibuprofen that she should.  She never had any imaging study done     PROBLEM LIST   Patient Active Problem List   Diagnosis Code     ADD (attention deficit disorder) F98.8     Elevated blood pressure reading without diagnosis of hypertension R03.0     Fatty infiltration of liver K76.0     Chronic mixed headache syndrome G44.89         PAST MEDICAL & SURGICAL HISTORY     Past Medical History:   Patient  has no past medical history on file.    Surgical History:  She  has no past surgical history on file.     SOCIAL HISTORY     Reviewed, and she  reports that she has been smoking. She has been smoking about 0.50 packs per day. She has never used smokeless tobacco. She reports that she does not drink alcohol.     FAMILY HISTORY     Reviewed, and family history includes Cerebrovascular Disease in her paternal grandmother; Diabetes in her mother; Heart Disease in her paternal grandmother; Hyperlipidemia in her father; Hypertension in her father; Migraines in her maternal grandmother; Osteoporosis in her father; Thyroid Disease in her father.     ALLERGIES     Allergies   Allergen Reactions     Hydrocodone-Acetaminophen Itching     Iron Itching     Latex Hives     Oxycodone Itching         REVIEW OF SYSTEMS     A 12 point review of system was performed and was negative except as outlined in the history of present illness.     HOME MEDICATIONS     Current Outpatient Rx   Medication Sig Dispense Refill     amphetamine-dextroamphetamine (ADDERALL XR) 10 MG 24 hr capsule Take 1 capsule (10 mg) by mouth daily 30 capsule 0     loratadine (CLARITIN) 10 MG tablet Take  "10 mg by mouth daily       nortriptyline (PAMELOR) 25 MG capsule 1 PO at bedtime x 1 week then 2 PO QHS 60 capsule 6     [START ON 10/12/2022] nortriptyline (PAMELOR) 50 MG capsule Take 1 capsule (50 mg) by mouth At Bedtime 30 capsule 11         PHYSICAL EXAM     Vital signs  /89 (BP Location: Right arm, Patient Position: Sitting)   Pulse 79   Ht 1.651 m (5' 5\")   Wt 93.9 kg (207 lb)   LMP 08/10/2022   BMI 34.45 kg/m      Weight:   207 lbs 0 oz    Patient is alert and oriented x4 in no acute distress. Vital signs were reviewed and are documented in electronic medical record. Neck was supple, no carotid bruits, thyromegaly, JVD, or lymphadenopathy was noted.   NEUROLOGY EXAM:    Patient s speech was normal with no aphasia or dysarthria. Mentation, and affect were also normal.     Funduscopic exam was normal, with normal cup to disc ratio. Cranial nerves II -XII were intact.     Patient had normal mass, tone and motor strength was 5/5 in all extremities without pronator drift.     Sensation was intact to light touch, pinprick, and vibratory sensation.     Reflexes were 1+ symmetrical with downgoing toes.     No dysmetria noted on FNF or HKS. Romberg was negative.    Gait testing was normal. Able to walk on toes/heels. Tandem walk normal.     PERTINENT DIAGNOSTIC STUDIES     Following studies were reviewed:     CT SINUSES 4/24/2022  Partial effacement of the left ostiomeatal unit secondary to inflammatory mucosal thickening. Patent right ostiomeatal unit. The frontoethmoidal and sphenoethmoidal recesses are patent bilaterally.     Mild inflammatory mucosal thickening of the paranasal sinuses as described above.     PERTINENT LABS  Following labs were reviewed:  Office Visit on 08/26/2022   Component Date Value     Group A Strep antigen 08/26/2022 Negative      SARS CoV2 PCR 08/26/2022 Negative      Group A strep by PCR 08/26/2022 Not Detected         Total time spent for face to face visit, reviewing " labs/imaging studies, counseling and coordination of care was: 1 Hour spent on the date of the encounter doing chart review, review of outside records, review of test results, interpretation of tests, patient visit and documentation       This note was dictated using voice recognition software.  Any grammatical or context distortions are unintentional and inherent to the software.    No orders of the defined types were placed in this encounter.     New Prescriptions    NORTRIPTYLINE (PAMELOR) 25 MG CAPSULE    1 PO at bedtime x 1 week then 2 PO QHS    NORTRIPTYLINE (PAMELOR) 50 MG CAPSULE    Take 1 capsule (50 mg) by mouth At Bedtime      Modified Medications    No medications on file

## 2022-09-12 NOTE — NURSING NOTE
Chief Complaint   Patient presents with     Headache     Migraines after having 1st baby and had epidural. Then worsened after having COVID Dec 2021. Has 1 migraine per month      Viri Mcintyre CMA on 9/12/2022 at 1:27 PM

## 2022-09-25 ENCOUNTER — HEALTH MAINTENANCE LETTER (OUTPATIENT)
Age: 37
End: 2022-09-25

## 2022-10-14 ENCOUNTER — MYC MEDICAL ADVICE (OUTPATIENT)
Dept: FAMILY MEDICINE | Facility: CLINIC | Age: 37
End: 2022-10-14

## 2022-10-14 ENCOUNTER — OFFICE VISIT (OUTPATIENT)
Dept: FAMILY MEDICINE | Facility: CLINIC | Age: 37
End: 2022-10-14
Payer: COMMERCIAL

## 2022-10-14 VITALS
SYSTOLIC BLOOD PRESSURE: 124 MMHG | OXYGEN SATURATION: 97 % | WEIGHT: 198.7 LBS | RESPIRATION RATE: 14 BRPM | HEART RATE: 89 BPM | DIASTOLIC BLOOD PRESSURE: 80 MMHG | BODY MASS INDEX: 33.07 KG/M2

## 2022-10-14 DIAGNOSIS — Z00.00 ANNUAL PHYSICAL EXAM: Primary | ICD-10-CM

## 2022-10-14 DIAGNOSIS — F90.9 ATTENTION DEFICIT HYPERACTIVITY DISORDER (ADHD), UNSPECIFIED ADHD TYPE: ICD-10-CM

## 2022-10-14 DIAGNOSIS — R03.0 ELEVATED BLOOD PRESSURE READING WITHOUT DIAGNOSIS OF HYPERTENSION: ICD-10-CM

## 2022-10-14 DIAGNOSIS — G43.809 OTHER MIGRAINE WITHOUT STATUS MIGRAINOSUS, NOT INTRACTABLE: ICD-10-CM

## 2022-10-14 DIAGNOSIS — Z13.1 SCREENING FOR DIABETES MELLITUS: ICD-10-CM

## 2022-10-14 DIAGNOSIS — K76.0 FATTY INFILTRATION OF LIVER: ICD-10-CM

## 2022-10-14 DIAGNOSIS — K59.00 CONSTIPATION, UNSPECIFIED CONSTIPATION TYPE: ICD-10-CM

## 2022-10-14 DIAGNOSIS — R79.89 ELEVATED LFTS: ICD-10-CM

## 2022-10-14 DIAGNOSIS — Z12.4 CERVICAL CANCER SCREENING: ICD-10-CM

## 2022-10-14 DIAGNOSIS — J30.1 SEASONAL ALLERGIC RHINITIS DUE TO POLLEN: ICD-10-CM

## 2022-10-14 LAB
ALBUMIN SERPL BCG-MCNC: 4.6 G/DL (ref 3.5–5.2)
ALP SERPL-CCNC: 97 U/L (ref 35–104)
ALT SERPL W P-5'-P-CCNC: 23 U/L (ref 10–35)
ANION GAP SERPL CALCULATED.3IONS-SCNC: 12 MMOL/L (ref 7–15)
AST SERPL W P-5'-P-CCNC: 28 U/L (ref 10–35)
BILIRUB SERPL-MCNC: 0.2 MG/DL
BUN SERPL-MCNC: 10.3 MG/DL (ref 6–20)
CALCIUM SERPL-MCNC: 9.6 MG/DL (ref 8.6–10)
CHLORIDE SERPL-SCNC: 105 MMOL/L (ref 98–107)
CHOLEST SERPL-MCNC: 203 MG/DL
CREAT SERPL-MCNC: 0.77 MG/DL (ref 0.51–0.95)
DEPRECATED HCO3 PLAS-SCNC: 24 MMOL/L (ref 22–29)
GFR SERPL CREATININE-BSD FRML MDRD: >90 ML/MIN/1.73M2
GLUCOSE SERPL-MCNC: 82 MG/DL (ref 70–99)
HBA1C MFR BLD: 5.7 % (ref 0–5.6)
HDLC SERPL-MCNC: 27 MG/DL
LDLC SERPL CALC-MCNC: 128 MG/DL
NONHDLC SERPL-MCNC: 176 MG/DL
POTASSIUM SERPL-SCNC: 4.3 MMOL/L (ref 3.4–5.3)
PROT SERPL-MCNC: 7.8 G/DL (ref 6.4–8.3)
SODIUM SERPL-SCNC: 141 MMOL/L (ref 136–145)
TRIGL SERPL-MCNC: 238 MG/DL

## 2022-10-14 PROCEDURE — 80053 COMPREHEN METABOLIC PANEL: CPT | Performed by: PHYSICIAN ASSISTANT

## 2022-10-14 PROCEDURE — 80061 LIPID PANEL: CPT | Performed by: PHYSICIAN ASSISTANT

## 2022-10-14 PROCEDURE — 36415 COLL VENOUS BLD VENIPUNCTURE: CPT | Performed by: PHYSICIAN ASSISTANT

## 2022-10-14 PROCEDURE — 83036 HEMOGLOBIN GLYCOSYLATED A1C: CPT | Performed by: PHYSICIAN ASSISTANT

## 2022-10-14 PROCEDURE — 99395 PREV VISIT EST AGE 18-39: CPT | Performed by: PHYSICIAN ASSISTANT

## 2022-10-14 RX ORDER — SUMATRIPTAN 25 MG/1
25 TABLET, FILM COATED ORAL
Qty: 30 TABLET | Refills: 3 | Status: SHIPPED | OUTPATIENT
Start: 2022-10-14

## 2022-10-14 RX ORDER — DEXTROAMPHETAMINE SACCHARATE, AMPHETAMINE ASPARTATE MONOHYDRATE, DEXTROAMPHETAMINE SULFATE AND AMPHETAMINE SULFATE 2.5; 2.5; 2.5; 2.5 MG/1; MG/1; MG/1; MG/1
10 CAPSULE, EXTENDED RELEASE ORAL DAILY
Qty: 30 CAPSULE | Refills: 0 | Status: SHIPPED | OUTPATIENT
Start: 2022-10-14 | End: 2022-10-17

## 2022-10-14 ASSESSMENT — ENCOUNTER SYMPTOMS
ARTHRALGIAS: 0
DIARRHEA: 0
PARESTHESIAS: 0
JOINT SWELLING: 0
NAUSEA: 0
SHORTNESS OF BREATH: 0
CHILLS: 0
HEADACHES: 1
FREQUENCY: 1
HEMATURIA: 0
SORE THROAT: 0
WEAKNESS: 0
DYSURIA: 0
FEVER: 0
HEMATOCHEZIA: 0
EYE PAIN: 0
FREQUENCY: 0
MYALGIAS: 0
DIZZINESS: 0
COUGH: 0
NERVOUS/ANXIOUS: 0
CONSTIPATION: 1
ABDOMINAL PAIN: 0
BREAST MASS: 0
PALPITATIONS: 0
HEARTBURN: 0

## 2022-10-14 ASSESSMENT — PAIN SCALES - GENERAL: PAINLEVEL: NO PAIN (0)

## 2022-10-14 NOTE — PROGRESS NOTES
SUBJECTIVE:   CC: Caro is an 37 year old who presents for preventive health visit.     Patient has been advised of split billing requirements and indicates understanding: Yes  The patient is a pleasant 37-year-old female that presents to the clinic today for annual physical.  She has a past medical history of ADHD, migraines, elevated blood pressure without the diagnosis of hypertension, seasonal allergies, chronic constipation, and fatty liver disease.  She is feeling well today.  No questions or concerns regarding her chronic health.    She does have underlying history of ADHD and was started on Adderall 10 mg about 2 months ago.  She feels that her ADHD symptoms are well controlled.  She is sleeping better and she is focusing much better.  She feels that the doses adequate at this time    She does have chronic migraines.  She was on nortriptyline but stopped this due to worsening constipation.  She continues to have migraines on a weekly basis.  She is hoping since her ADHD is better controlled that her overall stress in her life decreases and these also improved.  He is taking over-the-counter Tylenol and ibuprofen for headaches.  She is wondering about taking a medication to help prevent the migraines from occurring.    Seasonal allergies-doing well at this time.  She was on Singulair but this caused her to have nightmares so she discontinued this.  She feels that her seasonal allergies are well controlled at this time    Past medical history of fatty liver disease-underwent an ultrasound July 2020 which showed mild fatty liver.  LFTs have been elevated in the past.  She has been working on diet and exercise    Healthy Habits:     Getting at least 3 servings of Calcium per day:  Yes    Bi-annual eye exam:  Yes    Dental care twice a year:  Yes    Sleep apnea or symptoms of sleep apnea:  None    Diet:  Vegetarian/vegan    Frequency of exercise:  2-3 days/week    Duration of exercise:  15-30 minutes     Taking medications regularly:  Yes    Medication side effects:  Not applicable    PHQ-2 Total Score: 0    Additional concerns today:  No          Today's PHQ-2 Score:   PHQ-2 ( 1999 [employer]) 10/14/2022   Q1: Little interest or pleasure in doing things 0   Q2: Feeling down, depressed or hopeless 0   PHQ-2 Score 0   Q1: Little interest or pleasure in doing things Not at all   Q2: Feeling down, depressed or hopeless Not at all   PHQ-2 Score 0       Abuse: Current or Past (Physical, Sexual or Emotional) - No  Do you feel safe in your environment? Yes    Have you ever done Advance Care Planning? (For example, a Health Directive, POLST, or a discussion with a medical provider or your loved ones about your wishes): Yes, patient states has an Advance Care Planning document and will bring a copy to the clinic.    Social History     Tobacco Use     Smoking status: Every Day     Packs/day: 0.50     Types: Cigarettes     Smokeless tobacco: Never   Substance Use Topics     Alcohol use: No     If you drink alcohol do you typically have >3 drinks per day or >7 drinks per week? No    Alcohol Use 10/14/2022   Prescreen: >3 drinks/day or >7 drinks/week? No   Prescreen: >3 drinks/day or >7 drinks/week? -   No flowsheet data found.    Reviewed orders with patient.  Reviewed health maintenance and updated orders accordingly - Yes  Lab work is in process    Breast Cancer Screening:    Breast CA Risk Assessment (FHS-7) 10/14/2022   Do you have a family history of breast, colon, or ovarian cancer? No / Unknown       click delete button to remove this line now  Patient under 40 years of age: Routine Mammogram Screening not recommended.   Pertinent mammograms are reviewed under the imaging tab.    History of abnormal Pap smear: NO - age 30- 65 PAP every 3 years recommended     Reviewed and updated as needed this visit by clinical staff   Tobacco  Allergies  Meds              Reviewed and updated as needed this visit by Provider                  No past medical history on file.   No past surgical history on file.  OB History   No obstetric history on file.       Review of Systems   Constitutional: Negative for chills and fever.   HENT: Negative for congestion, ear pain, hearing loss and sore throat.    Eyes: Negative for pain and visual disturbance.   Respiratory: Negative for cough and shortness of breath.    Cardiovascular: Negative for chest pain, palpitations and peripheral edema.   Gastrointestinal: Positive for constipation. Negative for abdominal pain, diarrhea, heartburn, hematochezia and nausea.   Breasts:  Negative for tenderness, breast mass and discharge.   Genitourinary: Negative for dysuria, frequency, genital sores, hematuria, pelvic pain, urgency, vaginal bleeding and vaginal discharge.   Musculoskeletal: Negative for arthralgias, joint swelling and myalgias.   Skin: Negative for rash.   Neurological: Positive for headaches. Negative for dizziness, weakness and paresthesias.   Psychiatric/Behavioral: Negative for mood changes. The patient is not nervous/anxious.           OBJECTIVE:   /80 (BP Location: Left arm, Patient Position: Sitting, Cuff Size: Adult Regular)   Pulse 89   Resp 14   Wt 90.1 kg (198 lb 11.2 oz)   SpO2 97%   BMI 33.07 kg/m    Physical Exam  Constitutional:       General: She is not in acute distress.     Appearance: She is well-developed.   HENT:      Right Ear: Tympanic membrane and external ear normal.      Left Ear: Tympanic membrane and external ear normal.      Nose: Nose normal.      Mouth/Throat:      Pharynx: No oropharyngeal exudate.   Eyes:      General:         Right eye: No discharge.         Left eye: No discharge.      Conjunctiva/sclera: Conjunctivae normal.      Pupils: Pupils are equal, round, and reactive to light.   Neck:      Thyroid: No thyromegaly.      Trachea: No tracheal deviation.   Cardiovascular:      Rate and Rhythm: Normal rate and regular rhythm.      Pulses: Normal pulses.       Heart sounds: Normal heart sounds, S1 normal and S2 normal. No murmur heard.    No friction rub. No S3 or S4 sounds.   Pulmonary:      Effort: Pulmonary effort is normal. No respiratory distress.      Breath sounds: Normal breath sounds. No wheezing or rales.   Abdominal:      General: Bowel sounds are normal.      Palpations: Abdomen is soft. There is no mass.      Tenderness: There is no abdominal tenderness.   Musculoskeletal:         General: Normal range of motion.      Cervical back: Neck supple.   Lymphadenopathy:      Cervical: No cervical adenopathy.   Skin:     General: Skin is warm and dry.      Findings: No rash.   Neurological:      Mental Status: She is alert and oriented to person, place, and time.      Motor: No abnormal muscle tone.      Deep Tendon Reflexes: Reflexes are normal and symmetric.   Psychiatric:         Thought Content: Thought content normal.         Judgment: Judgment normal.           ASSESSMENT/PLAN:       ICD-10-CM    1. Annual physical exam  Z00.00 Lipid panel reflex to direct LDL Fasting     Lipid panel reflex to direct LDL Fasting      2. Attention deficit hyperactivity disorder (ADHD), unspecified ADHD type  F90.9 amphetamine-dextroamphetamine (ADDERALL XR) 10 MG 24 hr capsule      3. Cervical cancer screening  Z12.4       4. Other migraine without status migrainosus, not intractable  G43.809 SUMAtriptan (IMITREX) 25 MG tablet      5. Elevated LFTs  R79.89 Comprehensive metabolic panel (BMP + Alb, Alk Phos, ALT, AST, Total. Bili, TP)     Comprehensive metabolic panel (BMP + Alb, Alk Phos, ALT, AST, Total. Bili, TP)      6. Screening for diabetes mellitus  Z13.1 Hemoglobin A1c     Hemoglobin A1c      7. Seasonal allergic rhinitis due to pollen  J30.1       8. Fatty infiltration of liver  K76.0       9. Elevated blood pressure reading without diagnosis of hypertension  R03.0       10. Constipation, unspecified constipation type  K59.00         #1 annual physical-we will  "update screening labs.  She did have a CBC 3/2022 which was stable.  She also had a BMP at that time.  She is up-to-date on Pap she gets this done at [facility].    #2 ADHD-she was started back on Adderall 10 mg about 1 or 2 months ago.  Her focus concentration is much better.  She is sleeping better.  We will continue her on her current dose.  We will see her back in 3 months for recheck on ADHD    #3 migraines-stable at this time.  Continues to have weekly migraines.  Discontinue nortriptyline as she has chronic constipation which made this worse.  We will start her on Imitrex to use as needed.  Side effects of the medication were discussed.  She was seen by neurology.  She is to continue to follow-up with neurology as needed    #4 seasonal allergies-stable at this time.  She did discontinue Singulair as it caused nightmares.  She is to continue over-the-counter antihistamines    #5 fatty liver disease-she has had mildly elevated LFTs in the past.  She did undergo an ultrasound of her liver July 2020 which showed mild fatty liver.  We discussed diet and exercise.  We will check her liver function today.    #6 screening for type 2 diabetes-we will check an A1c today    #7 immunizations-he does not wish to update any immunizations today    #8 cervical cancer screening-she is up-to-date.  She gets her Paps through [facility].    #9 constipation-she does have chronic constipation.  Since increasing her fiber this is improved.  She was using stool softeners in the past but has not needed this recently    Patient has been advised of split billing requirements and indicates understanding: Yes    COUNSELING:  Reviewed preventive health counseling, as reflected in patient instructions       Regular exercise       Healthy diet/nutrition       Vision screening    Estimated body mass index is 33.07 kg/m  as calculated from the following:    Height as of 9/12/22: 1.651 m (5' 5\").    Weight as of this encounter: 90.1 kg " (198 lb 11.2 oz).    Weight management plan: Discussed healthy diet and exercise guidelines      Counseling Resources:  ATP IV Guidelines  Pooled Cohorts Equation Calculator  Breast Cancer Risk Calculator  BRCA-Related Cancer Risk Assessment: FHS-7 Tool  FRAX Risk Assessment  ICSI Preventive Guidelines  Dietary Guidelines for Americans, 2010  USDA's MyPlate  ASA Prophylaxis  Lung CA Screening    [facility]

## 2022-10-17 RX ORDER — DEXTROAMPHETAMINE SACCHARATE, AMPHETAMINE ASPARTATE MONOHYDRATE, DEXTROAMPHETAMINE SULFATE AND AMPHETAMINE SULFATE 2.5; 2.5; 2.5; 2.5 MG/1; MG/1; MG/1; MG/1
10 CAPSULE, EXTENDED RELEASE ORAL DAILY
Qty: 30 CAPSULE | Refills: 0 | Status: SHIPPED | OUTPATIENT
Start: 2022-10-17 | End: 2023-01-09

## 2022-11-09 VITALS
BODY MASS INDEX: 31.16 KG/M2 | TEMPERATURE: 97.9 F | HEART RATE: 76 BPM | RESPIRATION RATE: 18 BRPM | WEIGHT: 187 LBS | HEIGHT: 65 IN | OXYGEN SATURATION: 95 % | SYSTOLIC BLOOD PRESSURE: 160 MMHG | DIASTOLIC BLOOD PRESSURE: 89 MMHG

## 2022-11-09 PROCEDURE — 96361 HYDRATE IV INFUSION ADD-ON: CPT

## 2022-11-09 PROCEDURE — 96375 TX/PRO/DX INJ NEW DRUG ADDON: CPT

## 2022-11-09 PROCEDURE — 96374 THER/PROPH/DIAG INJ IV PUSH: CPT | Mod: 59

## 2022-11-09 PROCEDURE — 99285 EMERGENCY DEPT VISIT HI MDM: CPT | Mod: 25

## 2022-11-10 ENCOUNTER — HOSPITAL ENCOUNTER (EMERGENCY)
Facility: CLINIC | Age: 37
Discharge: HOME | End: 2022-11-10
Attending: EMERGENCY MEDICINE | Admitting: EMERGENCY MEDICINE
Payer: COMMERCIAL

## 2022-11-10 ENCOUNTER — MYC MEDICAL ADVICE (OUTPATIENT)
Dept: FAMILY MEDICINE | Facility: CLINIC | Age: 37
End: 2022-11-10

## 2022-11-10 ENCOUNTER — APPOINTMENT (OUTPATIENT)
Dept: CT IMAGING | Facility: CLINIC | Age: 37
End: 2022-11-10
Attending: EMERGENCY MEDICINE
Payer: COMMERCIAL

## 2022-11-10 DIAGNOSIS — R51.9 ACUTE NONINTRACTABLE HEADACHE, UNSPECIFIED HEADACHE TYPE: ICD-10-CM

## 2022-11-10 DIAGNOSIS — R42 VERTIGO: ICD-10-CM

## 2022-11-10 LAB
ANION GAP SERPL CALCULATED.3IONS-SCNC: 10 MMOL/L (ref 5–18)
BUN SERPL-MCNC: 9 MG/DL (ref 8–22)
CALCIUM SERPL-MCNC: 9.3 MG/DL (ref 8.5–10.5)
CHLORIDE BLD-SCNC: 108 MMOL/L (ref 98–107)
CO2 SERPL-SCNC: 24 MMOL/L (ref 22–31)
CREAT SERPL-MCNC: 0.85 MG/DL (ref 0.6–1.1)
ERYTHROCYTE [DISTWIDTH] IN BLOOD BY AUTOMATED COUNT: 12.7 % (ref 10–15)
FLUAV RNA SPEC QL NAA+PROBE: NEGATIVE
FLUBV RNA RESP QL NAA+PROBE: NEGATIVE
GFR SERPL CREATININE-BSD FRML MDRD: 90 ML/MIN/1.73M2
GLUCOSE BLD-MCNC: 121 MG/DL (ref 70–125)
HCT VFR BLD AUTO: 38.8 % (ref 35–47)
HGB BLD-MCNC: 13 G/DL (ref 11.7–15.7)
MCH RBC QN AUTO: 28.5 PG (ref 26.5–33)
MCHC RBC AUTO-ENTMCNC: 33.5 G/DL (ref 31.5–36.5)
MCV RBC AUTO: 85 FL (ref 78–100)
PLATELET # BLD AUTO: 282 10E3/UL (ref 150–450)
POTASSIUM BLD-SCNC: 4.1 MMOL/L (ref 3.5–5)
RBC # BLD AUTO: 4.56 10E6/UL (ref 3.8–5.2)
RSV RNA SPEC NAA+PROBE: NEGATIVE
SARS-COV-2 RNA RESP QL NAA+PROBE: NEGATIVE
SODIUM SERPL-SCNC: 142 MMOL/L (ref 136–145)
WBC # BLD AUTO: 12.5 10E3/UL (ref 4–11)

## 2022-11-10 PROCEDURE — 250N000011 HC RX IP 250 OP 636: Performed by: EMERGENCY MEDICINE

## 2022-11-10 PROCEDURE — 36415 COLL VENOUS BLD VENIPUNCTURE: CPT | Performed by: EMERGENCY MEDICINE

## 2022-11-10 PROCEDURE — 70496 CT ANGIOGRAPHY HEAD: CPT

## 2022-11-10 PROCEDURE — 70498 CT ANGIOGRAPHY NECK: CPT

## 2022-11-10 PROCEDURE — 80048 BASIC METABOLIC PNL TOTAL CA: CPT | Performed by: EMERGENCY MEDICINE

## 2022-11-10 PROCEDURE — 87637 SARSCOV2&INF A&B&RSV AMP PRB: CPT | Performed by: EMERGENCY MEDICINE

## 2022-11-10 PROCEDURE — 258N000003 HC RX IP 258 OP 636: Performed by: EMERGENCY MEDICINE

## 2022-11-10 PROCEDURE — 250N000013 HC RX MED GY IP 250 OP 250 PS 637: Performed by: EMERGENCY MEDICINE

## 2022-11-10 PROCEDURE — 85027 COMPLETE CBC AUTOMATED: CPT | Performed by: EMERGENCY MEDICINE

## 2022-11-10 RX ORDER — IOPAMIDOL 755 MG/ML
75 INJECTION, SOLUTION INTRAVASCULAR ONCE
Status: COMPLETED | OUTPATIENT
Start: 2022-11-10 | End: 2022-11-10

## 2022-11-10 RX ORDER — DIAZEPAM 10 MG/2ML
2.5 INJECTION, SOLUTION INTRAMUSCULAR; INTRAVENOUS ONCE
Status: COMPLETED | OUTPATIENT
Start: 2022-11-10 | End: 2022-11-10

## 2022-11-10 RX ORDER — MECLIZINE HYDROCHLORIDE 25 MG/1
25 TABLET ORAL ONCE
Status: COMPLETED | OUTPATIENT
Start: 2022-11-10 | End: 2022-11-10

## 2022-11-10 RX ORDER — ONDANSETRON 8 MG/1
8 TABLET, ORALLY DISINTEGRATING ORAL EVERY 8 HOURS PRN
Qty: 12 TABLET | Refills: 0 | Status: SHIPPED | OUTPATIENT
Start: 2022-11-10 | End: 2022-11-16

## 2022-11-10 RX ORDER — KETOROLAC TROMETHAMINE 30 MG/ML
30 INJECTION, SOLUTION INTRAMUSCULAR; INTRAVENOUS ONCE
Status: COMPLETED | OUTPATIENT
Start: 2022-11-10 | End: 2022-11-10

## 2022-11-10 RX ORDER — ONDANSETRON 2 MG/ML
4 INJECTION INTRAMUSCULAR; INTRAVENOUS ONCE
Status: COMPLETED | OUTPATIENT
Start: 2022-11-10 | End: 2022-11-10

## 2022-11-10 RX ORDER — DEXAMETHASONE SODIUM PHOSPHATE 10 MG/ML
10 INJECTION, SOLUTION INTRAMUSCULAR; INTRAVENOUS ONCE
Status: COMPLETED | OUTPATIENT
Start: 2022-11-10 | End: 2022-11-10

## 2022-11-10 RX ORDER — MECLIZINE HYDROCHLORIDE 25 MG/1
25 TABLET ORAL 3 TIMES DAILY PRN
Qty: 30 TABLET | Refills: 0 | Status: SHIPPED | OUTPATIENT
Start: 2022-11-10 | End: 2022-11-16

## 2022-11-10 RX ORDER — DIAZEPAM 5 MG
5 TABLET ORAL EVERY 6 HOURS PRN
Qty: 10 TABLET | Refills: 0 | Status: SHIPPED | OUTPATIENT
Start: 2022-11-10 | End: 2022-11-16

## 2022-11-10 RX ADMIN — DEXAMETHASONE SODIUM PHOSPHATE 10 MG: 10 INJECTION, SOLUTION INTRAMUSCULAR; INTRAVENOUS at 02:14

## 2022-11-10 RX ADMIN — IOPAMIDOL 75 ML: 755 INJECTION, SOLUTION INTRAVENOUS at 01:28

## 2022-11-10 RX ADMIN — KETOROLAC TROMETHAMINE 30 MG: 30 INJECTION, SOLUTION INTRAMUSCULAR; INTRAVENOUS at 00:52

## 2022-11-10 RX ADMIN — DIAZEPAM 2.5 MG: 5 INJECTION, SOLUTION INTRAMUSCULAR; INTRAVENOUS at 00:53

## 2022-11-10 RX ADMIN — MECLIZINE HYDROCHLORIDE 25 MG: 25 TABLET ORAL at 00:57

## 2022-11-10 RX ADMIN — SODIUM CHLORIDE 1000 ML: 9 INJECTION, SOLUTION INTRAVENOUS at 00:47

## 2022-11-10 RX ADMIN — ONDANSETRON 4 MG: 2 INJECTION INTRAMUSCULAR; INTRAVENOUS at 00:48

## 2022-11-10 ASSESSMENT — ENCOUNTER SYMPTOMS
ABDOMINAL PAIN: 0
FEVER: 0
SPEECH DIFFICULTY: 0
HEADACHES: 1
DIZZINESS: 1
FATIGUE: 0
NECK STIFFNESS: 0
SHORTNESS OF BREATH: 0
NECK PAIN: 1
SINUS PRESSURE: 0
NUMBNESS: 0

## 2022-11-10 ASSESSMENT — ACTIVITIES OF DAILY LIVING (ADL): ADLS_ACUITY_SCORE: 35

## 2022-11-10 NOTE — ED TRIAGE NOTES
"Pt states this morning she had some pressure in the back of her head. States if she turns her head to the left she becomes nauseas and the room starts spinning. Hx of migraines, but states different type and feels like her head is about to \"pop\". Pt took imitrex at 2030, with no relief.       "

## 2022-11-10 NOTE — ED PROVIDER NOTES
EMERGENCY DEPARTMENT ENCOUNTER      NAME: Caro Conrad  AGE: 37 year old female  YOB: 1985  MRN: [id number]  EVALUATION DATE & TIME: 11/10/2022 12:06 AM    PCP: Earnest Marrero    ED PROVIDER: Trudy Torres M.D.      Chief Complaint   Patient presents with     Headache     Dizziness     Nausea         FINAL IMPRESSION:  1. Vertigo    2. Acute nonintractable headache, unspecified headache type        MEDICAL DECISION MAKING:    Pertinent Labs & Imaging studies reviewed. (See chart for details)     Afebrile.  Vital signs here with hypertension, could be secondary to discomfort.  Patient coming in with vertigo and headache.  Story is as above.  Physical exam for patient here does appear to be mildly uncomfortable.  She does have two beat nystagmus with far left lateral gaze.  Slight fluid noted behind bilateral tympanic membranes but without bulging or evidence of infection.  Tenderness palpation paraspinally on the right down to trapezius muscle.  No midline C-spine tenderness.  Equal strength sensation bilateral upper and lower extremities.    Plan for head neck given headache is different from baseline.  We will try some meclizine, Valium for vertigo, Toradol Zofran for headache and reevaluate.      1:24 AM: Patient starting to feel much improved after medications.    Patient feeling completely improved after medications.  Labs, CTA here unremarkable.  Will discharge home, she has followed with ENT in the past, told may need to follow-up.  She is in agreement with this plan.  Given a dose of dexamethasone to help prevent rebound migraine.    Medical Decision Making    Supplemental history from: N/A    External Record(s) Reviewed: Outpatient Record    Differential Diagnosis: See MDM charting for differential considered.     I performed an independent interpretation of the: N/A    Discussed with radiology regarding test interpretation: N/A    Discussion of management with another provider: See  ED Course    The following testing was considered but ultimately not selected: None     I considered prescription management with: Symptomatic Management    The patient's care impacted: None    Consideration of Admission/Observation: N/A - Patient discharged without consideration for admission    Care significantly affected by Social Determinants of Health including: N/A       Critical care: 0 minutes excluding separately billable procedures.  Includes bedside management, time reviewing test results, review of records, discussing the case with staff, documenting the medical record and time spent with family members (or surrogate decision makers) discussing specific treatment issues.          ED COURSE:  12:15 AM I met with the patient, obtained history, performed an initial exam, and discussed options and plan for diagnostics and treatment here in the ED. PPE worn: n95 mask, surgical mask, surgical cap, gloves   1:57 AM I rechecked the patient. Patient is feeling better.     The importance of close follow up was discussed. We reviewed warning signs and symptoms, and I instructed Ms. Conrad to return to the emergency department immediately if she develops any new or worsening symptoms. I provided additional verbal discharge instructions. Ms. Conrad expressed understanding and agreement with this plan of care, her questions were answered, and she was discharged in stable condition.     MEDICATIONS GIVEN IN THE EMERGENCY:  Medications   dexamethasone PF (DECADRON) injection 10 mg (has no administration in time range)   diazepam (VALIUM) injection 2.5 mg (2.5 mg Intravenous Given 11/10/22 0053)   ketorolac (TORADOL) injection 30 mg (30 mg Intravenous Given 11/10/22 0052)   ondansetron (ZOFRAN) injection 4 mg (4 mg Intravenous Given 11/10/22 0048)   meclizine (ANTIVERT) tablet 25 mg (25 mg Oral Given 11/10/22 0057)   0.9% sodium chloride BOLUS (1,000 mLs Intravenous New Bag 11/10/22 0047)   iopamidol (ISOVUE-370)  solution 75 mL (75 mLs Intravenous Given 11/10/22 0128)       NEW PRESCRIPTIONS STARTED AT TODAY'S ER VISIT:  New Prescriptions    DIAZEPAM (VALIUM) 5 MG TABLET    Take 1 tablet (5 mg) by mouth every 6 hours as needed (vertigo)    MECLIZINE (ANTIVERT) 25 MG TABLET    Take 1 tablet (25 mg) by mouth 3 times daily as needed for dizziness    ONDANSETRON (ZOFRAN ODT) 8 MG ODT TAB    Take 1 tablet (8 mg) by mouth every 8 hours as needed for nausea          =================================================================    HPI    Patient information was obtained from: Patient    Use of : N/A         Caro Conrad is a 37 year old female who presents with vertigo, right-sided headache posteriorly, head pressure.  Says it started earlier today.  She noted that she was vertiginous, especially when she looked over to the left-hand side.  Had 1 episode where she did stumble today secondary to having spins.  She does state vertigo in the past once when she had COVID.  Has not been sick recently.  No falls or trauma.  No recent chiropractic visits.  No fevers no chills.  Developed headache at about 4 PM, posterior, primarily on the right-hand side with some radiation down to her neck.  No runny nose or sinus congestion.  Does have a history of migraine headache but states this does feel different.  Took Imitrex prior to coming in.  No improvement after Imitrex.      REVIEW OF SYSTEMS   Review of Systems   Constitutional: Negative for fatigue and fever.   HENT: Negative for congestion, hearing loss and sinus pressure.    Respiratory: Negative for shortness of breath.    Cardiovascular: Negative for chest pain.   Gastrointestinal: Negative for abdominal pain.   Musculoskeletal: Positive for neck pain. Negative for neck stiffness.   Neurological: Positive for dizziness and headaches. Negative for syncope, speech difficulty and numbness.   All other systems reviewed and are negative.        PAST MEDICAL  "HISTORY:  No past medical history on file.    PAST SURGICAL HISTORY:  No past surgical history on file.    CURRENT MEDICATIONS:      Current Facility-Administered Medications:      dexamethasone PF (DECADRON) injection 10 mg, 10 mg, Intravenous, Once, Trudy Torres MD    Current Outpatient Medications:      diazepam (VALIUM) 5 MG tablet, Take 1 tablet (5 mg) by mouth every 6 hours as needed (vertigo), Disp: 10 tablet, Rfl: 0     meclizine (ANTIVERT) 25 MG tablet, Take 1 tablet (25 mg) by mouth 3 times daily as needed for dizziness, Disp: 30 tablet, Rfl: 0     ondansetron (ZOFRAN ODT) 8 MG ODT tab, Take 1 tablet (8 mg) by mouth every 8 hours as needed for nausea, Disp: 12 tablet, Rfl: 0     amphetamine-dextroamphetamine (ADDERALL XR) 10 MG 24 hr capsule, Take 1 capsule (10 mg) by mouth daily, Disp: 30 capsule, Rfl: 0     loratadine (CLARITIN) 10 MG tablet, Take 10 mg by mouth daily, Disp: , Rfl:      SUMAtriptan (IMITREX) 25 MG tablet, Take 1 tablet (25 mg) by mouth at onset of headache for migraine May repeat in 2 hours. Max 8 tablets/24 hours., Disp: 30 tablet, Rfl: 3    ALLERGIES:  Allergies   Allergen Reactions     Hydrocodone-Acetaminophen Itching     Iron Itching     Latex Hives     Oxycodone Itching       FAMILY HISTORY:  Family History   Problem Relation Age of Onset     Diabetes Mother      Hyperlipidemia Father      Hypertension Father      Thyroid Disease Father      Osteoporosis Father      Migraines Maternal Grandmother      Cerebrovascular Disease Paternal Grandmother      Heart Disease Paternal Grandmother        SOCIAL HISTORY:   Social History     Socioeconomic History     Marital status:    Tobacco Use     Smoking status: Every Day     Packs/day: 0.50     Types: Cigarettes     Smokeless tobacco: Never   Substance and Sexual Activity     Alcohol use: No       PHYSICAL EXAM:    Vitals: BP (!) 160/89   Pulse 76   Temp 97.9  F (36.6  C) (Oral)   Resp 18   Ht 1.651 m (5' 5\")   Wt 84.8 kg " (187 lb)   SpO2 95%   BMI 31.12 kg/m     General:. Alert and interactive, mildly uncomfortable appearing  HENT: Oropharynx without erythema or exudates. MMM.  TMs with mild fluid noted bilaterally without erythema or bulging.  Eyes: Pupils mid-sized and equally reactive.  2 beat nystagmus with far left lateral gaze.  Neck: Full AROM.  Right-sided paraspinal tenderness to palpation down to the trapezius but no midline C-spine tenderness.  Cardiovascular: Regular rate and rhythm. Peripheral pulses 2+ bilaterally.  Chest/Pulmonary: Normal work of breathing. Lung sounds clear and equal throughout, no wheezes or crackles. No chest wall tenderness or deformities.  Abdomen: Soft, nondistended. Nontender without guarding or rebound.  Back/Spine: No CVA or midline tenderness.  Extremities: Normal ROM of all major joints. No lower extremity edema.   Skin: Warm and dry. Normal skin color.   Neuro: Speech clear. CNs grossly intact. Moves all extremities appropriately. Strength and sensation grossly intact to all extremities.   Psych: Normal affect/mood, cooperative, memory appropriate.     LAB:  All pertinent labs reviewed and interpreted.  Labs Ordered and Resulted from Time of ED Arrival to Time of ED Departure   BASIC METABOLIC PANEL - Abnormal       Result Value    Sodium 142      Potassium 4.1      Chloride 108 (*)     Carbon Dioxide (CO2) 24      Anion Gap 10      Urea Nitrogen 9      Creatinine 0.85      Calcium 9.3      Glucose 121      GFR Estimate 90     CBC WITH PLATELETS - Abnormal    WBC Count 12.5 (*)     RBC Count 4.56      Hemoglobin 13.0      Hematocrit 38.8      MCV 85      MCH 28.5      MCHC 33.5      RDW 12.7      Platelet Count 282     INFLUENZA A/B & SARS-COV2 PCR MULTIPLEX       RADIOLOGY:  CTA Head Neck with Contrast   Final Result   IMPRESSION:    HEAD CT:   1.  Normal head CT.      HEAD CTA:    1.  Normal CTA [place].      NECK CTA:   1.  Normal neck CTA.            PROCEDURES:    \      Trudy Torres M.D.  Emergency Medicine  [facility] EMERGENCY ROOM  [address] [facility] [address] [phone]  Dept: [phone]     Trudy Torres MD  11/10/22 0208

## 2022-11-14 NOTE — TELEPHONE ENCOUNTER
ED recommended following up with ENT for vertigo. Order pended.  Ban Huston RN on 11/14/2022 at 2:03 PM

## 2022-11-16 ENCOUNTER — OFFICE VISIT (OUTPATIENT)
Dept: FAMILY MEDICINE | Facility: CLINIC | Age: 37
End: 2022-11-16
Payer: COMMERCIAL

## 2022-11-16 VITALS
RESPIRATION RATE: 20 BRPM | SYSTOLIC BLOOD PRESSURE: 122 MMHG | WEIGHT: 197 LBS | OXYGEN SATURATION: 97 % | HEART RATE: 98 BPM | DIASTOLIC BLOOD PRESSURE: 76 MMHG | HEIGHT: 65 IN | BODY MASS INDEX: 32.82 KG/M2 | TEMPERATURE: 98.5 F

## 2022-11-16 DIAGNOSIS — E04.1 THYROID NODULE: ICD-10-CM

## 2022-11-16 DIAGNOSIS — H81.12 BENIGN PAROXYSMAL POSITIONAL VERTIGO OF LEFT EAR: ICD-10-CM

## 2022-11-16 PROCEDURE — 99214 OFFICE O/P EST MOD 30 MIN: CPT | Performed by: PHYSICIAN ASSISTANT

## 2022-11-16 RX ORDER — MECLIZINE HYDROCHLORIDE 25 MG/1
25 TABLET ORAL 3 TIMES DAILY PRN
Qty: 30 TABLET | Refills: 1 | Status: SHIPPED | OUTPATIENT
Start: 2022-11-16

## 2022-11-16 ASSESSMENT — ENCOUNTER SYMPTOMS
NAUSEA: 0
PALPITATIONS: 0
SHORTNESS OF BREATH: 0
HEARTBURN: 0
DIZZINESS: 1
HEADACHES: 1
ABDOMINAL PAIN: 0
FATIGUE: 0
VOMITING: 0
CHILLS: 0
WHEEZING: 0
COUGH: 0
DIARRHEA: 0

## 2022-11-16 NOTE — PROGRESS NOTES
Chief Complaint   Patient presents with     ER F/U         Assessment & Plan       ICD-10-CM    1. Benign paroxysmal positional vertigo of left ear  H81.12 Physical Therapy Referral     meclizine (ANTIVERT) 25 MG tablet      2. Thyroid nodule  E04.1 US Thyroid        #1 BPPV-dizziness upon turning to the left with head movements.  No neurological deficits noted on exam today.  I do not suspect a central etiology for her dizziness.  We will have her continue the meclizine.  We will have her set up to see physical therapy for vestibular treatment.  She is to continue to monitor symptoms.  Symptoms for prompt reevaluation were discussed    #2 thyroid nodule-incidental 7 mm nodule found on CT in the emergency department.  She states that her dad had thyroid nodules removed in the past.  We will set her up for thyroid ultrasound for further evaluation regarding this.    Total time spent was35 minutes including reviewing records prior to arrival, consultation, placing orders, education, and reviewing the plan of care on the date of service.       MED REC REQUIRED  Post Medication Reconciliation Status:           No follow-ups on file.    Earnest Marrero PA-C  [facility]    Catalina Elizondo is a 37 year old, presenting for the following health issues:  ER F/U      The patient is a pleasant 37-year-old female that presents to the clinic today for ER follow-up.  She was seen in the emergency department 11/9/2022 for dizziness and migraine.  She did undergo a CTA of her head and neck which was negative for any acute findings.  Incidental 7 mm nodule on her thyroid was found.  She was given meclizine and Valium at the time of discharge.   She did stop the Valium as it made her dizziness worse but continue to take meclizine which has been helping.  She is taking 1-1/2 tablets a day of the meclizine.  The dizziness has improved but its continuing still at times with turning her head to the left.  "  Migraine has resolved.SHe does have chronic headaches and has been using Imitrex which has been helping.  She has not been having any nausea or vomiting since leaving the ER.  No new symptoms.  She denies any chest pain shortness of breath.  She states that the dizziness is more so when she turns her head to the left.  Going from a sitting to a standing position does not typically cause the dizziness.         Review of Systems   Constitutional: Negative for chills and fatigue.   HENT: Negative.    Respiratory: Negative for cough, shortness of breath and wheezing.    Cardiovascular: Negative for chest pain and palpitations.   Gastrointestinal: Negative for abdominal pain, diarrhea, heartburn, nausea and vomiting.   Genitourinary: Negative.    Neurological: Positive for dizziness (impoved) and headaches (impoved).            Objective    /76   Pulse 98   Temp 98.5  F (36.9  C)   Resp 20   Ht 1.651 m (5' 5\")   Wt 89.4 kg (197 lb)   LMP 11/11/2022   SpO2 97%   BMI 32.78 kg/m    Body mass index is 32.78 kg/m .  Physical Exam  Vitals and nursing note reviewed.   Constitutional:       Appearance: Normal appearance.   HENT:      Head: Normocephalic and atraumatic.   Eyes:      General:         Right eye: No discharge.         Left eye: No discharge.      Extraocular Movements: Extraocular movements intact.      Conjunctiva/sclera: Conjunctivae normal.      Pupils: Pupils are equal, round, and reactive to light.   Cardiovascular:      Rate and Rhythm: Normal rate and regular rhythm.      Heart sounds: No murmur heard.    No friction rub. No gallop.   Pulmonary:      Effort: Pulmonary effort is normal.      Breath sounds: No wheezing, rhonchi or rales.   Musculoskeletal:      Cervical back: Neck supple.   Neurological:      General: No focal deficit present.      Mental Status: She is alert and oriented to person, place, and time.      GCS: GCS eye subscore is 4. GCS verbal subscore is 5. GCS motor subscore is " 6.      Cranial Nerves: No cranial nerve deficit.      Motor: No weakness.      Coordination: Finger-Nose-Finger Test normal.      Gait: Gait is intact.      Deep Tendon Reflexes:      Reflex Scores:       Patellar reflexes are 2+ on the right side and 2+ on the left side.     Comments: Negative neurological exam.  Negative cerebellar testing.  Cranial nerves II through XII intact.  No weakness to the upper or lower extremities

## 2022-12-09 ENCOUNTER — HOSPITAL ENCOUNTER (OUTPATIENT)
Dept: ULTRASOUND IMAGING | Facility: CLINIC | Age: 37
Discharge: HOME | End: 2022-12-09
Attending: PHYSICIAN ASSISTANT | Admitting: PHYSICIAN ASSISTANT
Payer: COMMERCIAL

## 2022-12-09 DIAGNOSIS — E04.1 THYROID NODULE: ICD-10-CM

## 2022-12-09 PROCEDURE — 76536 US EXAM OF HEAD AND NECK: CPT

## 2023-01-01 ENCOUNTER — MYC MEDICAL ADVICE (OUTPATIENT)
Dept: FAMILY MEDICINE | Facility: CLINIC | Age: 38
End: 2023-01-01

## 2023-01-02 NOTE — CONFIDENTIAL NOTE
[employer] message for decreased hearing and ear fullness.  Naomy Rahman RN on 1/2/2023 at 4:13 PM

## 2023-01-09 ENCOUNTER — OFFICE VISIT (OUTPATIENT)
Dept: FAMILY MEDICINE | Facility: CLINIC | Age: 38
End: 2023-01-09
Payer: COMMERCIAL

## 2023-01-09 VITALS
HEIGHT: 65 IN | WEIGHT: 199 LBS | OXYGEN SATURATION: 97 % | SYSTOLIC BLOOD PRESSURE: 128 MMHG | HEART RATE: 80 BPM | DIASTOLIC BLOOD PRESSURE: 88 MMHG | BODY MASS INDEX: 33.15 KG/M2 | RESPIRATION RATE: 18 BRPM | TEMPERATURE: 98 F

## 2023-01-09 DIAGNOSIS — R61 NIGHT SWEATS: ICD-10-CM

## 2023-01-09 DIAGNOSIS — H81.12 BENIGN PAROXYSMAL POSITIONAL VERTIGO OF LEFT EAR: ICD-10-CM

## 2023-01-09 DIAGNOSIS — H69.93 DYSFUNCTION OF BOTH EUSTACHIAN TUBES: Primary | ICD-10-CM

## 2023-01-09 DIAGNOSIS — F90.9 ATTENTION DEFICIT HYPERACTIVITY DISORDER (ADHD), UNSPECIFIED ADHD TYPE: ICD-10-CM

## 2023-01-09 LAB
ANION GAP SERPL CALCULATED.3IONS-SCNC: 14 MMOL/L (ref 7–15)
BUN SERPL-MCNC: 14.1 MG/DL (ref 6–20)
CALCIUM SERPL-MCNC: 9.3 MG/DL (ref 8.6–10)
CHLORIDE SERPL-SCNC: 105 MMOL/L (ref 98–107)
CREAT SERPL-MCNC: 0.82 MG/DL (ref 0.51–0.95)
DEPRECATED HCO3 PLAS-SCNC: 23 MMOL/L (ref 22–29)
ERYTHROCYTE [DISTWIDTH] IN BLOOD BY AUTOMATED COUNT: 13 % (ref 10–15)
GFR SERPL CREATININE-BSD FRML MDRD: >90 ML/MIN/1.73M2
GLUCOSE SERPL-MCNC: 113 MG/DL (ref 70–99)
HBA1C MFR BLD: 6 % (ref 0–5.6)
HCT VFR BLD AUTO: 38.9 % (ref 35–47)
HGB BLD-MCNC: 13.1 G/DL (ref 11.7–15.7)
MCH RBC QN AUTO: 28.9 PG (ref 26.5–33)
MCHC RBC AUTO-ENTMCNC: 33.7 G/DL (ref 31.5–36.5)
MCV RBC AUTO: 86 FL (ref 78–100)
PLATELET # BLD AUTO: 345 10E3/UL (ref 150–450)
POTASSIUM SERPL-SCNC: 4.3 MMOL/L (ref 3.4–5.3)
RBC # BLD AUTO: 4.54 10E6/UL (ref 3.8–5.2)
SODIUM SERPL-SCNC: 142 MMOL/L (ref 136–145)
TSH SERPL DL<=0.005 MIU/L-ACNC: 0.68 UIU/ML (ref 0.3–4.2)
WBC # BLD AUTO: 10.3 10E3/UL (ref 4–11)

## 2023-01-09 PROCEDURE — 85027 COMPLETE CBC AUTOMATED: CPT | Performed by: PHYSICIAN ASSISTANT

## 2023-01-09 PROCEDURE — 99214 OFFICE O/P EST MOD 30 MIN: CPT | Performed by: PHYSICIAN ASSISTANT

## 2023-01-09 PROCEDURE — 83036 HEMOGLOBIN GLYCOSYLATED A1C: CPT | Performed by: PHYSICIAN ASSISTANT

## 2023-01-09 PROCEDURE — 36415 COLL VENOUS BLD VENIPUNCTURE: CPT | Performed by: PHYSICIAN ASSISTANT

## 2023-01-09 PROCEDURE — 80048 BASIC METABOLIC PNL TOTAL CA: CPT | Performed by: PHYSICIAN ASSISTANT

## 2023-01-09 PROCEDURE — 84443 ASSAY THYROID STIM HORMONE: CPT | Performed by: PHYSICIAN ASSISTANT

## 2023-01-09 RX ORDER — PREDNISONE 20 MG/1
40 TABLET ORAL DAILY
Qty: 10 TABLET | Refills: 0 | Status: SHIPPED | OUTPATIENT
Start: 2023-01-09 | End: 2023-01-14

## 2023-01-09 RX ORDER — DEXTROAMPHETAMINE SACCHARATE, AMPHETAMINE ASPARTATE MONOHYDRATE, DEXTROAMPHETAMINE SULFATE AND AMPHETAMINE SULFATE 2.5; 2.5; 2.5; 2.5 MG/1; MG/1; MG/1; MG/1
10 CAPSULE, EXTENDED RELEASE ORAL DAILY
Qty: 30 CAPSULE | Refills: 0 | Status: SHIPPED | OUTPATIENT
Start: 2023-01-09 | End: 2023-02-15

## 2023-01-09 ASSESSMENT — ENCOUNTER SYMPTOMS
WHEEZING: 0
CHILLS: 0
HEADACHES: 0
SINUS PRESSURE: 1
SINUS PAIN: 1
DIZZINESS: 1
COUGH: 0
GASTROINTESTINAL NEGATIVE: 1
RHINORRHEA: 1
SHORTNESS OF BREATH: 0
LIGHT-HEADEDNESS: 0
FATIGUE: 1

## 2023-01-09 ASSESSMENT — PAIN SCALES - GENERAL: PAINLEVEL: NO PAIN (0)

## 2023-01-09 NOTE — PROGRESS NOTES
Assessment & Plan       ICD-10-CM    1. Dysfunction of both eustachian tubes  H69.83       2. Benign paroxysmal positional vertigo of left ear  H81.12       3. Attention deficit hyperactivity disorder (ADHD), unspecified ADHD type  F90.9 amphetamine-dextroamphetamine (ADDERALL XR) 10 MG 24 hr capsule      4. Night sweats  R61 CBC with platelets     Basic metabolic panel     TSH     Hemoglobin A1c     CBC with platelets     Basic metabolic panel     TSH     Hemoglobin A1c          #1 bilateral serous otitis  #2 eustachian tube dysfunction  She does have fluid behind both tympanic membranes.  No evidence of infection.  She also is having sinus pressure and congestion.  I do not suspect sinus infection.  I suspect a taste into dysfunction.  She is to continue Claritin start Flonase and I will start her on prednisone 40 mg x 5 days.  She is to let me know in about a week or so if things are not better.     #3 ADHD  Doing well on Adderall 10 mg daily.  PDMP was reviewed no red flags noted.  Medications refilled today    #4 night sweats  We will check some labs including a CBC, BMP, TSH, and A1c today.  He is worried about diabetes.  She did have a A1c 2 months ago which was mildly elevated at 5.7.  She like to recheck this today.  I suspect her night sweats are from her being sick over the last month.  No enlarged lymph nodes.  No cough.  No intentional weight loss.  She did have a ultrasound of her thyroid which showed some small nodules that do not need follow-up or fine-needle aspiration at this time.  We will make sure her thyroid function is within normal range as well.    #5 vertigo  Although up with physical therapy.  Continue with meclizine.      No follow-ups on file.    KASI Malone [facility]    Catalina Elizondo is a 37 year old accompanied by her son, presenting for the following health issues:  Recheck Medication (Med check and refills ), Ear Problem (Pressure in  ears mainly left but some right ), and Night Sweats (Pt having night sweats worse lately, did have it when pregnant and had gestational diabetes )      The patient is a pleasant 37-year-old female who presents to the clinic today for evaluation on decreased hearing bilaterally.  She has been on and off 6 throughout September.  She was sick with influenza the beginning of December and then again at the end of December.  She was having upper respiratory symptoms at that time.  Majority of her symptoms have improved but she continues to have decreased hearing and muffling more so on the right than the left.  No pain.  No drainage.    Had night sweats almost nightly for the last a month.  None and no unintentional weight loss.  She has not noticed any enlarged lymph nodes or swollen glands.  She is worried she may have diabetes as these were symptoms that she was having when she was pregnant with one of her children.    She also has been doing well on Adderall 10 mg daily.  She feels that her focus and attention is doing well.    She was seen in the emergency department for vertigo.  This has improved.  She did have a referral to PT but since she has been sick in December she has not been able to go to her physical therapy appointment.  She has been using meclizine as needed.    History of Present Illness       Reason for visit:  Hearing med refill    She eats 0-1 servings of fruits and vegetables daily.She consumes 0 sweetened beverage(s) daily.She exercises with enough effort to increase her heart rate 9 or less minutes per day.  She exercises with enough effort to increase her heart rate 3 or less days per week.   She is taking medications regularly.           Review of Systems   Constitutional: Positive for fatigue. Negative for chills.   HENT: Positive for ear pain, rhinorrhea, sinus pressure and sinus pain. Negative for dental problem, drooling and ear discharge.         Decreased hearing   Respiratory: Negative  "for cough, shortness of breath and wheezing.    Gastrointestinal: Negative.    Endocrine:        Night sweats for a month   Neurological: Positive for dizziness. Negative for light-headedness and headaches.            Objective    /88   Pulse 80   Temp 98  F (36.7  C)   Resp 18   Ht 1.651 m (5' 5\")   Wt 90.3 kg (199 lb)   LMP 01/06/2023 (Exact Date)   SpO2 97%   BMI 33.12 kg/m    Body mass index is 33.12 kg/m .  Physical Exam  Vitals and nursing note reviewed.   Constitutional:       Appearance: Normal appearance.   HENT:      Head: Normocephalic and atraumatic.      Right Ear: Ear canal and external ear normal. A middle ear effusion is present. Tympanic membrane is not perforated, erythematous, retracted or bulging.      Left Ear: Ear canal and external ear normal. A middle ear effusion is present. Tympanic membrane is not perforated, erythematous, retracted or bulging.      Nose: Rhinorrhea present.   Eyes:      General: Lids are normal.         Right eye: No discharge.         Left eye: No discharge.      Conjunctiva/sclera: Conjunctivae normal.   Cardiovascular:      Rate and Rhythm: Normal rate and regular rhythm.      Heart sounds: No murmur heard.    No friction rub. No gallop.   Pulmonary:      Effort: Pulmonary effort is normal.      Breath sounds: Normal breath sounds and air entry.   Musculoskeletal:      Cervical back: Neck supple.   Neurological:      General: No focal deficit present.      Mental Status: She is alert and oriented to person, place, and time.      Gait: Gait is intact.                "

## 2023-02-15 ENCOUNTER — MYC MEDICAL ADVICE (OUTPATIENT)
Dept: FAMILY MEDICINE | Facility: CLINIC | Age: 38
End: 2023-02-15

## 2023-02-15 DIAGNOSIS — F90.9 ATTENTION DEFICIT HYPERACTIVITY DISORDER (ADHD), UNSPECIFIED ADHD TYPE: ICD-10-CM

## 2023-02-15 RX ORDER — DEXTROAMPHETAMINE SACCHARATE, AMPHETAMINE ASPARTATE MONOHYDRATE, DEXTROAMPHETAMINE SULFATE AND AMPHETAMINE SULFATE 2.5; 2.5; 2.5; 2.5 MG/1; MG/1; MG/1; MG/1
10 CAPSULE, EXTENDED RELEASE ORAL DAILY
Qty: 30 CAPSULE | Refills: 0 | Status: SHIPPED | OUTPATIENT
Start: 2023-02-15 | End: 2023-05-02

## 2023-03-08 ENCOUNTER — TELEPHONE (OUTPATIENT)
Dept: FAMILY MEDICINE | Facility: CLINIC | Age: 38
End: 2023-03-08
Payer: COMMERCIAL

## 2023-03-08 NOTE — TELEPHONE ENCOUNTER
Received fax from [facility] on the D-amphetamine ER 10 mg.  This drug is backordered until further notice, please send alternate or send to pharmacy that may have in stock.

## 2023-03-08 NOTE — TELEPHONE ENCOUNTER
Left message to call back for: patient  Information to relay to patient: she should call around to local pharmacies to see who has this in stock and let us know where to send her prescription.

## 2023-03-30 ENCOUNTER — OFFICE VISIT (OUTPATIENT)
Dept: INTERNAL MEDICINE | Facility: CLINIC | Age: 38
End: 2023-03-30
Payer: COMMERCIAL

## 2023-03-30 VITALS
TEMPERATURE: 96.3 F | RESPIRATION RATE: 16 BRPM | OXYGEN SATURATION: 96 % | BODY MASS INDEX: 33.82 KG/M2 | WEIGHT: 203 LBS | SYSTOLIC BLOOD PRESSURE: 121 MMHG | HEIGHT: 65 IN | HEART RATE: 93 BPM | DIASTOLIC BLOOD PRESSURE: 60 MMHG

## 2023-03-30 DIAGNOSIS — M54.2 NECK PAIN ON RIGHT SIDE: Primary | ICD-10-CM

## 2023-03-30 DIAGNOSIS — H65.91 OME (OTITIS MEDIA WITH EFFUSION), RIGHT: ICD-10-CM

## 2023-03-30 DIAGNOSIS — H92.01 RIGHT EAR PAIN: ICD-10-CM

## 2023-03-30 DIAGNOSIS — Z80.8 FAMILY HISTORY OF THYROID CANCER: ICD-10-CM

## 2023-03-30 DIAGNOSIS — F17.210 SMOKING GREATER THAN 10 PACK YEARS: ICD-10-CM

## 2023-03-30 PROCEDURE — 99214 OFFICE O/P EST MOD 30 MIN: CPT | Performed by: INTERNAL MEDICINE

## 2023-03-30 NOTE — PROGRESS NOTES
Assessment & Plan   Patient has right sided neck pain and swelling with right ear pain for a week, with no difficulty swallowing or breathing, fever, chills, sore throat, ear drainage. No improvement with taking 600 mg ibuprofen tid, Tylenol, warm compresses.    On the exam - bilat enlarged tonsils with no exudates, uvula in midline. Palpable tender mass, lymph node, on the right upper neck, below the jaw line. Right ear - redness in the ear canal and TM red, dull. Thyroid normal size with no palpable nodules.    Patient will take Augmentin for 10 days and NSAIds for pain.   Indications for emergency department discussed with patient, who verbalized good understanding and agreement.    Follow-up with PCP in 7-10 days of no improvement for further evaluation with imaging studies.        Neck pain on right side    - amoxicillin-clavulanate (AUGMENTIN) 875-125 MG tablet; Take 1 tablet by mouth 2 times daily    Right ear pain      Family history of thyroid cancer - father, not sure what type of thyroid cancer      Smoking greater than 10 pack years      OME (otitis media with effusion), right    - amoxicillin-clavulanate (AUGMENTIN) 875-125 MG tablet; Take 1 tablet by mouth 2 times daily                 Izaiah Sexton MD  [facility]    Catalina Elizondo is a 37 year old, presenting for the following health issues:  Pain (Neck Pain x 1 week )    Additional Questions 3/30/2023   Roomed by Josee     Pain    History of Present Illness       Reason for visit:  Pain in my neck area  Symptom onset:  3-7 days ago  Symptoms include:  Pain ear ache  Symptom intensity:  Moderate  Symptom progression:  Worsening  Had these symptoms before:  No    She eats 0-1 servings of fruits and vegetables daily.She consumes 0 sweetened beverage(s) daily.She exercises with enough effort to increase her heart rate 9 or less minutes per day.  She exercises with enough effort to increase her heart rate 3 or  "less days per week.   She is taking medications regularly.               Review of Systems         Objective    /60   Pulse 93   Temp (!) 96.3  F (35.7  C)   Resp 16   Ht 1.651 m (5' 5\")   Wt 92.1 kg (203 lb)   SpO2 96%   BMI 33.78 kg/m    Body mass index is 33.78 kg/m .  Physical Exam                       "

## 2023-05-02 RX ORDER — DEXTROAMPHETAMINE SACCHARATE, AMPHETAMINE ASPARTATE MONOHYDRATE, DEXTROAMPHETAMINE SULFATE AND AMPHETAMINE SULFATE 2.5; 2.5; 2.5; 2.5 MG/1; MG/1; MG/1; MG/1
10 CAPSULE, EXTENDED RELEASE ORAL DAILY
Qty: 30 CAPSULE | Refills: 0 | Status: SHIPPED | OUTPATIENT
Start: 2023-05-02 | End: 2023-05-15

## 2023-05-02 NOTE — TELEPHONE ENCOUNTER
Routing refill request to provider for review/approval because:  Drug not on the [facility] refill protocol     Signed Prescriptions:                        Disp   Refills    amphetamine-dextroamphetamine (ADDERALL XR*30 cap*0        Sig: Take 1 capsule (10 mg) by mouth daily  Authorizing Provider: REJI SALCEDO    Last Written Prescription Date:  2.15.23  Last Fill Quantity: 30,  # refills: 0   Last office visit: 1/9/2023 with prescribing provider:  Reji Salcedo PA-C   Future Office Visit:      Ana Nielson RN  [facility] Triage Nurse

## 2023-05-03 ENCOUNTER — TELEPHONE (OUTPATIENT)
Dept: FAMILY MEDICINE | Facility: CLINIC | Age: 38
End: 2023-05-03
Payer: COMMERCIAL

## 2023-05-03 NOTE — TELEPHONE ENCOUNTER
Fax from [facility] to advise D-amphetamine ER 10mg is on back order until further notice.    Please consider a drug change or send o another pharmacy that may have in stock.

## 2023-05-15 DIAGNOSIS — F90.9 ATTENTION DEFICIT HYPERACTIVITY DISORDER (ADHD), UNSPECIFIED ADHD TYPE: Primary | ICD-10-CM

## 2023-05-15 RX ORDER — METHYLPHENIDATE HYDROCHLORIDE 10 MG/1
10 TABLET ORAL DAILY
Qty: 7 TABLET | Refills: 0 | Status: SHIPPED | OUTPATIENT
Start: 2023-05-15 | End: 2024-08-15

## 2023-12-23 ENCOUNTER — HEALTH MAINTENANCE LETTER (OUTPATIENT)
Age: 38
End: 2023-12-23

## 2024-08-08 ENCOUNTER — OFFICE VISIT (OUTPATIENT)
Dept: FAMILY MEDICINE | Facility: CLINIC | Age: 39
End: 2024-08-08
Payer: COMMERCIAL

## 2024-08-08 VITALS
TEMPERATURE: 98.3 F | RESPIRATION RATE: 12 BRPM | HEIGHT: 66 IN | BODY MASS INDEX: 32.61 KG/M2 | HEART RATE: 95 BPM | WEIGHT: 202.9 LBS | SYSTOLIC BLOOD PRESSURE: 129 MMHG | OXYGEN SATURATION: 98 % | DIASTOLIC BLOOD PRESSURE: 87 MMHG

## 2024-08-08 DIAGNOSIS — R79.89 ELEVATED LFTS: ICD-10-CM

## 2024-08-08 DIAGNOSIS — L50.9 HIVES: Primary | ICD-10-CM

## 2024-08-08 DIAGNOSIS — G47.00 INSOMNIA, UNSPECIFIED TYPE: ICD-10-CM

## 2024-08-08 LAB
ALBUMIN SERPL BCG-MCNC: 4.5 G/DL (ref 3.5–5.2)
ALP SERPL-CCNC: 104 U/L (ref 40–150)
ALT SERPL W P-5'-P-CCNC: 19 U/L (ref 0–50)
ANION GAP SERPL CALCULATED.3IONS-SCNC: 11 MMOL/L (ref 7–15)
AST SERPL W P-5'-P-CCNC: 22 U/L (ref 0–45)
BILIRUB SERPL-MCNC: 0.3 MG/DL
BUN SERPL-MCNC: 11.3 MG/DL (ref 6–20)
CALCIUM SERPL-MCNC: 9.7 MG/DL (ref 8.8–10.4)
CHLORIDE SERPL-SCNC: 104 MMOL/L (ref 98–107)
CREAT SERPL-MCNC: 0.7 MG/DL (ref 0.51–0.95)
EGFRCR SERPLBLD CKD-EPI 2021: >90 ML/MIN/1.73M2
GLUCOSE SERPL-MCNC: 79 MG/DL (ref 70–99)
HCO3 SERPL-SCNC: 25 MMOL/L (ref 22–29)
POTASSIUM SERPL-SCNC: 4.5 MMOL/L (ref 3.4–5.3)
PROT SERPL-MCNC: 7.8 G/DL (ref 6.4–8.3)
SODIUM SERPL-SCNC: 140 MMOL/L (ref 135–145)
TSH SERPL DL<=0.005 MIU/L-ACNC: 1.47 UIU/ML (ref 0.3–4.2)

## 2024-08-08 PROCEDURE — 84443 ASSAY THYROID STIM HORMONE: CPT | Performed by: FAMILY MEDICINE

## 2024-08-08 PROCEDURE — 99214 OFFICE O/P EST MOD 30 MIN: CPT | Performed by: FAMILY MEDICINE

## 2024-08-08 PROCEDURE — 36415 COLL VENOUS BLD VENIPUNCTURE: CPT | Performed by: FAMILY MEDICINE

## 2024-08-08 PROCEDURE — 80053 COMPREHEN METABOLIC PANEL: CPT | Performed by: FAMILY MEDICINE

## 2024-08-08 RX ORDER — PREDNISONE 20 MG/1
TABLET ORAL
Qty: 15 TABLET | Refills: 0 | Status: SHIPPED | OUTPATIENT
Start: 2024-08-08 | End: 2024-08-18

## 2024-08-08 RX ORDER — TRAZODONE HYDROCHLORIDE 50 MG/1
50 TABLET, FILM COATED ORAL AT BEDTIME
Qty: 20 TABLET | Refills: 0 | Status: SHIPPED | OUTPATIENT
Start: 2024-08-08 | End: 2024-08-26

## 2024-08-08 ASSESSMENT — PAIN SCALES - GENERAL: PAINLEVEL: NO PAIN (0)

## 2024-08-08 NOTE — PROGRESS NOTES
"  Assessment & Plan   Problem List Items Addressed This Visit    None  Visit Diagnoses       Hives    -  Primary    Relevant Medications    predniSONE (DELTASONE) 20 MG tablet    Other Relevant Orders    Comprehensive metabolic panel (BMP + Alb, Alk Phos, ALT, AST, Total. Bili, TP) (Completed)    Elevated LFTs        Relevant Orders    TSH with free T4 reflex (Completed)    Comprehensive metabolic panel (BMP + Alb, Alk Phos, ALT, AST, Total. Bili, TP) (Completed)    Insomnia, unspecified type        Relevant Medications    traZODone (DESYREL) 50 MG tablet    Other Relevant Orders    TSH with free T4 reflex (Completed)    Comprehensive metabolic panel (BMP + Alb, Alk Phos, ALT, AST, Total. Bili, TP) (Completed)                  Nicotine/Tobacco Cessation  She reports that she has been smoking cigarettes. She has never used smokeless tobacco.        BMI  Estimated body mass index is 32.75 kg/m  as calculated from the following:    Height as of this encounter: 1.676 m (5' 6\").    Weight as of this encounter: 92 kg (202 lb 14.4 oz).             Catalina Elizondo is a 38 year old, presenting for the following health issues:  Rash (Itchy rash on back x 2 weeks. Pt was on a boat and wearing sunscreen with family and none of them have rash. Pt has been using cortisone lotion but that did not help. Pt has been using a calming lotion that helps but doesn't fix the problem. ) and Tachycardia (Pt states since she got sick a few months ago her heart rate has been high. Pt uses a peloton at home and it alerts her that her heart rate is in the 160's and alerts she is using 99% of her heart. Pt is worried about her high heart rate. )        8/8/2024    12:35 PM   Additional Questions   Roomed by Macie STRINGER CMA     History of Present Illness       Reason for visit:  Rash on back hands  Symptom onset:  1-2 weeks ago  Symptoms include:  Itchy red bumps  Symptom intensity:  Moderate  Symptom progression:  Staying the same  Had these " "symptoms before:  No  What makes it worse:  Sun  What makes it better:  Calming lotion    She eats 2-3 servings of fruits and vegetables daily.She consumes 1 sweetened beverage(s) daily.She exercises with enough effort to increase her heart rate 30 to 60 minutes per day.  She exercises with enough effort to increase her heart rate 5 days per week.   She is taking medications regularly.             Review of Systems  Constitutional, HEENT, cardiovascular, pulmonary, gi and gu systems are negative, except as otherwise noted.      Objective    /87 (BP Location: Right arm, Patient Position: Sitting, Cuff Size: Adult Regular)   Pulse 95   Temp 98.3  F (36.8  C) (Oral)   Resp 12   Ht 1.676 m (5' 6\")   Wt 92 kg (202 lb 14.4 oz)   LMP 07/15/2024 (Within Days)   SpO2 98%   Breastfeeding No   BMI 32.75 kg/m    Body mass index is 32.75 kg/m .  Physical Exam   GENERAL: alert and no distress  NECK: no adenopathy, no asymmetry, masses, or scars  RESP: lungs clear to auscultation - no rales, rhonchi or wheezes  CV: regular rate and rhythm, normal S1 S2, no S3 or S4, no murmur, click or rub, no peripheral edema  ABDOMEN: soft, nontender, no hepatosplenomegaly, no masses and bowel sounds normal  MS: no gross musculoskeletal defects noted, no edema  SKIN: Hives present on back splotchy macular pattern.            Signed Electronically by: JOHN GARCIA MD    "

## 2024-08-26 DIAGNOSIS — G47.00 INSOMNIA, UNSPECIFIED TYPE: ICD-10-CM

## 2024-08-26 RX ORDER — TRAZODONE HYDROCHLORIDE 50 MG/1
50 TABLET, FILM COATED ORAL AT BEDTIME
Qty: 30 TABLET | Refills: 0 | Status: SHIPPED | OUTPATIENT
Start: 2024-08-26 | End: 2024-10-01

## 2024-10-01 DIAGNOSIS — G47.00 INSOMNIA, UNSPECIFIED TYPE: ICD-10-CM

## 2024-10-01 RX ORDER — TRAZODONE HYDROCHLORIDE 50 MG/1
50 TABLET, FILM COATED ORAL AT BEDTIME
Qty: 30 TABLET | Refills: 0 | Status: SHIPPED | OUTPATIENT
Start: 2024-10-01

## 2025-01-18 ENCOUNTER — HEALTH MAINTENANCE LETTER (OUTPATIENT)
Age: 40
End: 2025-01-18

## 2025-05-30 PROBLEM — N89.9 NONINFLAMMATORY DISORDER OF VAGINA: Status: ACTIVE | Noted: 2025-05-30

## 2025-05-30 PROBLEM — N92.0 EXCESSIVE AND FREQUENT MENSTRUATION: Status: ACTIVE | Noted: 2025-05-30

## 2025-05-30 PROBLEM — N92.0 MENORRHAGIA WITH REGULAR CYCLE: Status: ACTIVE | Noted: 2025-05-30

## 2025-06-02 ENCOUNTER — PATIENT OUTREACH (OUTPATIENT)
Dept: CARE COORDINATION | Facility: CLINIC | Age: 40
End: 2025-06-02
Payer: COMMERCIAL

## 2025-06-04 ENCOUNTER — PATIENT OUTREACH (OUTPATIENT)
Dept: CARE COORDINATION | Facility: CLINIC | Age: 40
End: 2025-06-04
Payer: COMMERCIAL

## 2025-06-16 ENCOUNTER — ALLIED HEALTH/NURSE VISIT (OUTPATIENT)
Dept: FAMILY MEDICINE | Facility: CLINIC | Age: 40
End: 2025-06-16
Payer: COMMERCIAL

## 2025-06-16 VITALS — TEMPERATURE: 98.3 F

## 2025-06-16 DIAGNOSIS — Z23 NEED FOR TDAP VACCINATION: Primary | ICD-10-CM

## 2025-06-16 PROCEDURE — 99207 PR NO CHARGE NURSE ONLY: CPT

## 2025-06-16 PROCEDURE — 90714 TD VACC NO PRESV 7 YRS+ IM: CPT

## 2025-06-16 PROCEDURE — 90471 IMMUNIZATION ADMIN: CPT

## 2025-06-16 NOTE — PROGRESS NOTES
Prior to immuni.     Screening Questionnaire for Adult Immunization    Are you sick today?   No   Do you have allergies to medications, food, a vaccine component or latex?   No   Have you ever had a serious reaction after receiving a vaccination?   No   Do you have a long-term health problem with heart, lung, kidney, or metabolic disease (e.g., diabetes), asthma, a blood disorder, no spleen, complement component deficiency, a cochlear implant, or a spinal fluid leak?  Are you on long-term aspirin therapy?   No   Do you have cancer, leukemia, HIV/AIDS, or any other immune system problem?   No   Do you have a parent, brother, or sister with an immune system problem?   No   In the past 3 months, have you taken medications that affect  your immune system, such as prednisone, other steroids, or anticancer drugs; drugs for the treatment of rheumatoid arthritis, Crohn s disease, or psoriasis; or have you had radiation treatments?   No   Have you had a seizure, or a brain or other nervous system problem?   No   During the past year, have you received a transfusion of blood or blood    products, or been given immune (gamma) globulin or antiviral drug?   No   For women: Are you pregnant or is there a chance you could become       pregnant during the next month?   No   Have you received any vaccinations in the past 4 weeks?   No     Immunization questionnaire answers were all negative.    I have reviewed the following standing orders:   This patient is due and qualifies for a TD vaccine.    Click here for Tdap Standing Order    I have reviewed the vaccines inclusion and exclusion criteria; No concerns regarding eligibility.     Patient instructed to remain in clinic for 15 minutes afterwards, and to report any adverse reactions.     Screening performed by Mindi Almonte MA on 6/16/2025 at 3:40 PM.

## 2025-06-30 ENCOUNTER — MYC REFILL (OUTPATIENT)
Dept: FAMILY MEDICINE | Facility: CLINIC | Age: 40
End: 2025-06-30
Payer: COMMERCIAL

## 2025-06-30 DIAGNOSIS — E66.811 CLASS 1 OBESITY WITHOUT SERIOUS COMORBIDITY WITH BODY MASS INDEX (BMI) OF 33.0 TO 33.9 IN ADULT, UNSPECIFIED OBESITY TYPE: ICD-10-CM

## 2025-06-30 DIAGNOSIS — R73.03 PRE-DIABETES: ICD-10-CM

## 2025-08-30 ENCOUNTER — HEALTH MAINTENANCE LETTER (OUTPATIENT)
Age: 40
End: 2025-08-30

## 2025-09-02 ENCOUNTER — OFFICE VISIT (OUTPATIENT)
Dept: FAMILY MEDICINE | Facility: CLINIC | Age: 40
End: 2025-09-02
Attending: PHYSICIAN ASSISTANT
Payer: COMMERCIAL

## 2025-09-02 VITALS
RESPIRATION RATE: 18 BRPM | DIASTOLIC BLOOD PRESSURE: 88 MMHG | TEMPERATURE: 98 F | WEIGHT: 176 LBS | HEIGHT: 66 IN | BODY MASS INDEX: 28.28 KG/M2 | SYSTOLIC BLOOD PRESSURE: 126 MMHG | HEART RATE: 86 BPM | OXYGEN SATURATION: 98 %

## 2025-09-02 DIAGNOSIS — E66.811 CLASS 1 OBESITY WITHOUT SERIOUS COMORBIDITY WITH BODY MASS INDEX (BMI) OF 33.0 TO 33.9 IN ADULT, UNSPECIFIED OBESITY TYPE: ICD-10-CM

## 2025-09-02 DIAGNOSIS — D72.829 LEUKOCYTOSIS, UNSPECIFIED TYPE: ICD-10-CM

## 2025-09-02 DIAGNOSIS — G43.809 OTHER MIGRAINE WITHOUT STATUS MIGRAINOSUS, NOT INTRACTABLE: Primary | ICD-10-CM

## 2025-09-02 PROCEDURE — 3079F DIAST BP 80-89 MM HG: CPT | Performed by: PHYSICIAN ASSISTANT

## 2025-09-02 PROCEDURE — 99214 OFFICE O/P EST MOD 30 MIN: CPT | Performed by: PHYSICIAN ASSISTANT

## 2025-09-02 PROCEDURE — G2211 COMPLEX E/M VISIT ADD ON: HCPCS | Performed by: PHYSICIAN ASSISTANT

## 2025-09-02 PROCEDURE — 3074F SYST BP LT 130 MM HG: CPT | Performed by: PHYSICIAN ASSISTANT

## 2025-09-02 RX ORDER — SUMATRIPTAN SUCCINATE 25 MG/1
25 TABLET ORAL
Qty: 30 TABLET | Refills: 11 | Status: SHIPPED | OUTPATIENT
Start: 2025-09-02